# Patient Record
Sex: FEMALE | Race: WHITE | NOT HISPANIC OR LATINO | Employment: FULL TIME | URBAN - NONMETROPOLITAN AREA
[De-identification: names, ages, dates, MRNs, and addresses within clinical notes are randomized per-mention and may not be internally consistent; named-entity substitution may affect disease eponyms.]

---

## 2017-03-21 ENCOUNTER — OFFICE VISIT (OUTPATIENT)
Dept: RETAIL CLINIC | Facility: CLINIC | Age: 32
End: 2017-03-21

## 2017-03-21 VITALS — TEMPERATURE: 99.9 F | HEART RATE: 90 BPM | OXYGEN SATURATION: 97 % | RESPIRATION RATE: 18 BRPM

## 2017-03-21 DIAGNOSIS — J10.1 INFLUENZA A: Primary | ICD-10-CM

## 2017-03-21 DIAGNOSIS — J45.901 ASTHMA WITH ACUTE EXACERBATION, UNSPECIFIED ASTHMA SEVERITY: ICD-10-CM

## 2017-03-21 DIAGNOSIS — H65.191 OTHER ACUTE NONSUPPURATIVE OTITIS MEDIA OF RIGHT EAR, RECURRENCE NOT SPECIFIED: ICD-10-CM

## 2017-03-21 LAB
EXPIRATION DATE: NORMAL
FLUAV AG NPH QL: POSITIVE
FLUBV AG NPH QL: NEGATIVE
INTERNAL CONTROL: NORMAL
Lab: NORMAL

## 2017-03-21 PROCEDURE — 87804 INFLUENZA ASSAY W/OPTIC: CPT | Performed by: NURSE PRACTITIONER

## 2017-03-21 PROCEDURE — 99213 OFFICE O/P EST LOW 20 MIN: CPT | Performed by: NURSE PRACTITIONER

## 2017-03-21 RX ORDER — AMOXICILLIN 875 MG/1
875 TABLET, COATED ORAL 2 TIMES DAILY
Qty: 20 TABLET | Refills: 0 | Status: SHIPPED | OUTPATIENT
Start: 2017-03-21 | End: 2017-03-31

## 2017-03-21 RX ORDER — OSELTAMIVIR PHOSPHATE 75 MG/1
75 CAPSULE ORAL 2 TIMES DAILY
Qty: 10 CAPSULE | Refills: 0 | Status: SHIPPED | OUTPATIENT
Start: 2017-03-21 | End: 2020-05-29

## 2017-03-21 RX ORDER — PREDNISONE 10 MG/1
TABLET ORAL
Qty: 21 TABLET | Refills: 0 | Status: SHIPPED | OUTPATIENT
Start: 2017-03-21 | End: 2020-05-29

## 2017-03-21 RX ORDER — ALBUTEROL SULFATE 90 UG/1
2 AEROSOL, METERED RESPIRATORY (INHALATION) EVERY 4 HOURS PRN
COMMUNITY

## 2017-03-21 NOTE — PATIENT INSTRUCTIONS
"You have tested positive today for influenza or \"the flu.\" Because your symptoms began less than 48 hours ago, you are being treated with Tamiflu. This medication will not cure the flu, but it may help with reducing the severity and duration of the symptoms. The flu is a viral illness, and can last 10-14 days before it resolves. Symptomatic treatment is recommended - antibiotics will not help.  However, you should complete antibiotics as written for a secondary ear infection.  Take Ibuprofen or Tylenol as needed for fever. Mucinex or Robitussion may help with clearing cough and congestion. Increase your intake of clear, decaffinated fluids and get plenty of rest. For fever that persists beyond 5 days or worsening symptoms, follow up with your primary care provider. Pt verbalized understanding, visit summary given.     "

## 2017-03-21 NOTE — PROGRESS NOTES
Bing Sultana is a 31 y.o. female.   Chief Complaint   Patient presents with   • Flu Symptoms      HPI Comments: Intermittent right ear symptoms for several days.    Fever, chills, cough, sore throat, nasal congestion started abruptly yesterday.  Taking tylenol/motrin with some relief of fever.  Using albuterol more often.   Did not have flu vaccine.       Flu Symptoms   Associated symptoms include chills, congestion, coughing, fatigue, a fever (101), headaches and a sore throat. Pertinent negatives include no abdominal pain, chest pain, nausea, rash or vomiting.        The following portions of the patient's history were reviewed and updated as appropriate: allergies, current medications, past family history, past medical history, past social history, past surgical history and problem list.    Current Outpatient Prescriptions:   •  albuterol (PROVENTIL HFA;VENTOLIN HFA) 108 (90 BASE) MCG/ACT inhaler, Inhale 2 puffs Every 4 (Four) Hours As Needed for Wheezing., Disp: , Rfl:   •  BusPIRone HCl (BUSPAR PO), Take  by mouth., Disp: , Rfl:   •  Citalopram Hydrobromide (CELEXA PO), Take  by mouth., Disp: , Rfl:   •  mometasone-formoterol (DULERA 200) 200-5 MCG/ACT inhaler, Inhale 2 puffs 2 (Two) Times a Day., Disp: , Rfl:   •  RaNITidine HCl (ZANTAC PO), Take  by mouth., Disp: , Rfl:   •  amoxicillin (AMOXIL) 875 MG tablet, Take 1 tablet by mouth 2 (Two) Times a Day for 10 days., Disp: 20 tablet, Rfl: 0  •  oseltamivir (TAMIFLU) 75 MG capsule, Take 1 capsule by mouth 2 (Two) Times a Day., Disp: 10 capsule, Rfl: 0  •  PredniSONE (DELTASONE) 10 MG (21) tablet pack, Use as directed on package, Disp: 21 tablet, Rfl: 0    Review of Systems   Constitutional: Positive for appetite change (slightly decreased), chills, fatigue and fever (101).        No body aches   HENT: Positive for congestion, ear pain, postnasal drip, rhinorrhea, sinus pressure, sneezing, sore throat and voice change. Negative for facial swelling  and mouth sores. Ear discharge: pressure.    Eyes: Negative for discharge.   Respiratory: Positive for cough, chest tightness and wheezing. Negative for shortness of breath.    Cardiovascular: Negative for chest pain.   Gastrointestinal: Negative for abdominal pain, diarrhea, nausea and vomiting.   Skin: Negative for rash.   Neurological: Positive for headaches. Negative for dizziness and light-headedness.     Visit Vitals   • Pulse 90   • Temp 99.9 °F (37.7 °C)   • Resp 18   • SpO2 97%       Objective   No Known Allergies    Physical Exam   Constitutional: She is oriented to person, place, and time. She appears well-developed and well-nourished. She appears ill (mild). No distress.   HENT:   Head: Normocephalic and atraumatic.   Right Ear: External ear normal. Tympanic membrane is erythematous (moderate erythema, poor landmarks and light reflex). A middle ear effusion is present.   Left Ear: External ear normal. Tympanic membrane is not erythematous. A middle ear effusion is present.   Nose: Rhinorrhea present. Right sinus exhibits no maxillary sinus tenderness and no frontal sinus tenderness. Left sinus exhibits no maxillary sinus tenderness and no frontal sinus tenderness.   Mouth/Throat: Posterior oropharyngeal erythema (Mild with clear post nasal drip) present. No oropharyngeal exudate or posterior oropharyngeal edema.   Bilateral TM with mild serous effusion.    Significant nasal congestion.   Eyes: Conjunctivae, EOM and lids are normal.   Neck: Full passive range of motion without pain.   Cardiovascular: Normal rate and regular rhythm.    Pulmonary/Chest: Effort normal. No respiratory distress. She has wheezes (moderate wheezing, no rhonchi).   Abdominal: Soft. Normal appearance and bowel sounds are normal. There is no tenderness.   Lymphadenopathy:        Head (right side): No tonsillar adenopathy present.        Head (left side): No tonsillar adenopathy present.     She has no cervical adenopathy.    Neurological: She is alert and oriented to person, place, and time.   Skin: Skin is warm and dry. No rash noted.       Assessment/Plan   Basilia was seen today for flu symptoms.    Diagnoses and all orders for this visit:    Influenza A  -     POC Influenza A / B    Other acute nonsuppurative otitis media of right ear, recurrence not specified    Asthma with acute exacerbation, unspecified asthma severity    Other orders  -     oseltamivir (TAMIFLU) 75 MG capsule; Take 1 capsule by mouth 2 (Two) Times a Day.  -     amoxicillin (AMOXIL) 875 MG tablet; Take 1 tablet by mouth 2 (Two) Times a Day for 10 days.  -     PredniSONE (DELTASONE) 10 MG (21) tablet pack; Use as directed on package        Results for orders placed or performed in visit on 03/21/17   POC Influenza A / B   Result Value Ref Range    Rapid Influenza A Ag Positive     Rapid Influenza B Ag Negative     Internal Control Passed Passed    Lot Number [id number]     Expiration Date 08/31/2018

## 2019-06-21 ENCOUNTER — OFFICE VISIT (OUTPATIENT)
Dept: PULMONOLOGY | Facility: CLINIC | Age: 34
End: 2019-06-21

## 2019-06-21 VITALS
TEMPERATURE: 98 F | WEIGHT: 159 LBS | BODY MASS INDEX: 28.17 KG/M2 | OXYGEN SATURATION: 98 % | SYSTOLIC BLOOD PRESSURE: 118 MMHG | HEART RATE: 66 BPM | HEIGHT: 63 IN | DIASTOLIC BLOOD PRESSURE: 87 MMHG

## 2019-06-21 DIAGNOSIS — F17.210 NICOTINE DEPENDENCE, CIGARETTES, UNCOMPLICATED: ICD-10-CM

## 2019-06-21 DIAGNOSIS — J43.2 CENTRILOBULAR EMPHYSEMA (HCC): Primary | ICD-10-CM

## 2019-06-21 DIAGNOSIS — J41.0 SIMPLE CHRONIC BRONCHITIS (HCC): Primary | ICD-10-CM

## 2019-06-21 PROCEDURE — 99407 BEHAV CHNG SMOKING > 10 MIN: CPT | Performed by: NURSE PRACTITIONER

## 2019-06-21 PROCEDURE — 99243 OFF/OP CNSLTJ NEW/EST LOW 30: CPT | Performed by: NURSE PRACTITIONER

## 2019-06-21 RX ORDER — NICOTINE 21 MG/24HR
1 PATCH, TRANSDERMAL 24 HOURS TRANSDERMAL EVERY 24 HOURS
Qty: 14 PATCH | Refills: 0 | Status: SHIPPED | OUTPATIENT
Start: 2019-06-21 | End: 2020-05-29

## 2019-06-21 RX ORDER — NICOTINE 21 MG/24HR
1 PATCH, TRANSDERMAL 24 HOURS TRANSDERMAL EVERY 24 HOURS
Qty: 14 PATCH | Refills: 1 | Status: SHIPPED | OUTPATIENT
Start: 2019-06-21 | End: 2020-05-29

## 2019-06-21 RX ORDER — PANTOPRAZOLE SODIUM 40 MG/1
40 TABLET, DELAYED RELEASE ORAL DAILY
COMMUNITY

## 2019-06-21 RX ORDER — MONTELUKAST SODIUM 10 MG/1
10 TABLET ORAL NIGHTLY
COMMUNITY
End: 2023-02-16

## 2019-06-21 RX ORDER — LEVOTHYROXINE SODIUM 0.05 MG/1
50 TABLET ORAL DAILY
COMMUNITY

## 2019-06-21 NOTE — PROGRESS NOTES
[facility] [place].  Chest x-ray showed mild peribronchial cuffing in both lungs.  Will order a CT of the chest to further evaluate.

## 2019-06-21 NOTE — PROGRESS NOTES
Were you born premature?  No    Any Childhood infections? No      Breathing problems when you were a child? No    Any childhood allergies?    No           At what age did you begin smoking? 10 years old     Smoking marijuana? No    Any IV drugs? No    How many packs per day?  1 1/2 packs    Lung Function Test? no  Chest X-Ray? yes    CT Chest? no Allergy Test? yes    Family hx of Lung disease or Lung Cancer? Yes    If FHx is posivitive for lung cancer, what is the relationship of the family member? Grandpa ( moms side )    Any hospitalization in the last year? No    How far can you walk without getting short of breath? Not very far     Any coughing?  Yes    Any wheezing? Yes    Acid Reflux? Yes    Do you snore? Yes    Daytime Fatigue? Yes    Any pets? 10 cats (outside)   Any pet allergies? No    Occupation? [employer]     Have you been exposed to any chemicals at your job? No    What inhalers are you currently using? Asmanex, trelegy and albuterol and xopenex    Have you had the Influenza Vaccine? no   Would you like to receive this Vaccine today? no    Have you had the Pneumonia Vaccine?  no  Would you like to receive this Vaccine today? no      Subjective    Basilia Sultana presents for the following Asthma and COPD  .    History of Present Illness     Ms. Sultana is a 33 year old female that has a medical history significant for COPD.    A consultation was placed for COPD by Anum Mayes NP.  Ms. Sultana tells me that she has been told that she has COPD but no testing has ever been done to confirm this diagnosis.  States that she experiences short of breath that is mostly with exertion, and a cough that is worse in the morning time and with exertion.  She also tells me that she does have some wheezing that is predominant in the evening time.  She does state that she snores and has daytime fatigue and suffers from early morning headaches.  She is unsure about any witnessed episodes of apnea, but states  that she did a sleep study about 4 years ago and it showed that she did not have sleep apnea.  She is currently using Asmanex inhaler as well as Trelegy once daily.  She also has albuterol and Xopenex to use as needed.  She states that she smokes about 1/2 packs of cigarettes per day   And has smoked since she was about 10 years old.      Review of Systems   Respiratory: Positive for cough, shortness of breath and wheezing. Negative for chest tightness.    Cardiovascular: Negative for chest pain and palpitations.   Neurological: Positive for headaches. Negative for dizziness and light-headedness.       Active Problems:  Problem List Items Addressed This Visit        Respiratory    Simple chronic bronchitis (CMS/[facility]) - Primary    Relevant Medications    mometasone (ASMANEX TWISTHALER) inhaler 220 mcg/inhalation    Fluticasone-Umeclidin-Vilant 100-62.5-25 MCG/INH aerosol powder     montelukast (SINGULAIR) 10 MG tablet       Other    Nicotine dependence, cigarettes, uncomplicated          Past Medical History:  Past Medical History:   Diagnosis Date   • Acid reflux    • Allergic    • Anxiety    • Asthma    • COPD (chronic obstructive pulmonary disease) (CMS/[facility])        Family History:  History reviewed. No pertinent family history.    Social History:  Social History     Tobacco Use   • Smoking status: Former Smoker     Packs/day: 2.00     Years: 12.00     Pack years: 24.00     Last attempt to quit: 2017     Years since quittin.3   • Tobacco comment: Also daily passive smoke exposure   Substance Use Topics   • Alcohol use: No       Current Medications:  Current Outpatient Medications   Medication Sig Dispense Refill   • albuterol (PROVENTIL HFA;VENTOLIN HFA) 108 (90 BASE) MCG/ACT inhaler Inhale 2 puffs Every 4 (Four) Hours As Needed for Wheezing.     • BusPIRone HCl (BUSPAR PO) Take  by mouth.     • Citalopram Hydrobromide (CELEXA PO) Take  by mouth.     • Fluticasone-Umeclidin-Vilant 100-62.5-25 MCG/INH aerosol  "powder  Inhale.     • levothyroxine (SYNTHROID, LEVOTHROID) 50 MCG tablet Take 50 mcg by mouth Daily.     • mometasone (ASMANEX TWISTHALER) inhaler 220 mcg/inhalation Inhale 2 puffs Daily.     • montelukast (SINGULAIR) 10 MG tablet Take 10 mg by mouth Every Night.     • pantoprazole (PROTONIX) 40 MG EC tablet Take 40 mg by mouth Daily.     • mometasone-formoterol (DULERA 200) 200-5 MCG/ACT inhaler Inhale 2 puffs 2 (Two) Times a Day.     • oseltamivir (TAMIFLU) 75 MG capsule Take 1 capsule by mouth 2 (Two) Times a Day. 10 capsule 0   • PredniSONE (DELTASONE) 10 MG (21) tablet pack Use as directed on package 21 tablet 0   • RaNITidine HCl (ZANTAC PO) Take  by mouth.       No current facility-administered medications for this visit.        Allergies:  No Known Allergies    Vitals:  /87   Pulse 66   Temp 98 °F (36.7 °C)   Ht 160 cm (63\")   Wt 72.1 kg (159 lb)   SpO2 98%   BMI 28.17 kg/m²     Imaging:    Imaging Results (most recent)     None          Pulmonary Functions Testing Results:    No results found for: FEV1, FVC, [id number], TLC, DLCO    Results for orders placed or performed in visit on 03/21/17   POC Influenza A / B   Result Value Ref Range    Rapid Influenza A Ag Positive     Rapid Influenza B Ag Negative     Internal Control Passed Passed    Lot Number [id number]     Expiration Date 08/31/2018        Objective   Physical Exam     GENERAL APPEARANCE: Well developed, well nourished, alert and cooperative, and appears to be in no acute distress.    HEAD: normocephalic. Atraumatic.    EYES: PERRL, EOMI. Fundi normal, vision is grossly intact.    THROAT: Oral cavity and pharynx normal. No inflammation, swelling, exudate, or lesions.     NECK: Neck supple.  No thyromegaly.    CARDIAC: Normal S1 and S2. No S3, S4 or murmurs. Rhythm is regular. There is no peripheral edema, cyanosis or pallor. Extremities are warm and well perfused. Capillary refill is less than 2 seconds. No carotid " "bruits.    RESPIRATORY:Bilateral air entry positive. Bilateral diminished breath sounds. No wheezing, crackles or rhonchi noted.    GI: Positive bowel sounds. Soft, nondistended, nontender.     MUSCULOSKELETAL: No significant deformity or joint abnormality. No edema. Peripheral pulses intact. No varicosities.    NEUROLOGICAL: Strength and sensation symmetric and intact throughout.     PSYCHIATRIC: The mental examination revealed the patient was oriented to person, place, and time.       Assessment/Plan      COPD:  - Ordered alpha-1 antitrypsin levels.  If alpha antitrypsin levels are low then we will send the genotype and start the patient on enzyme supplementation.  - Continue ventolin and xopenex as needed.  Explained to patient that she only needed to be using one of these inhalers at a time.  -Continue Trelegy, one puff once daily.   -Instructed her to stop taking the asmanex.  -C[facility] in [place] [facility] and according to the patient showed \"bronchial cuffing.\"  Will obtain this report.  -Ordered a PFT to assess lung function.  -Ordered an echo to assess valvular function.  -will order an overnight oximetry to assess oxygen saturation when she is sleeping.    Patient's Body mass index is 28.17 kg/m². BMI is above normal parameters. Recommendations include: exercise counseling and nutrition counseling.      Current smoker:  -Ask about 1/2 packs/day.  -Patient states that she would like to quit and requests nicotine patches to be sent to her pharmacy.    Social History     Tobacco Use   Smoking Status Former Smoker   • Packs/day: 2.00   • Years: 12.00   • Pack years: 24.00   • Last attempt to quit: 2017   • Years since quittin.3   Tobacco Comment    Also daily passive smoke exposure       I advised Basilia of the risks of continuing to use tobacco, and I provided her with tobacco cessation educational materials in the After Visit Summary.     During this visit, I spent " greater than 10 minutes counseling the patient regarding tobacco cessation.        ICD-10-CM ICD-9-CM   1. Simple chronic bronchitis (CMS/[facility]) J41.0 491.0   2. Nicotine dependence, cigarettes, uncomplicated F17.210 305.1       Return in about 3 months (around 9/21/2019).

## 2019-06-26 ENCOUNTER — DOCUMENTATION (OUTPATIENT)
Dept: PULMONOLOGY | Facility: CLINIC | Age: 34
End: 2019-06-26

## 2019-07-08 ENCOUNTER — DOCUMENTATION (OUTPATIENT)
Dept: PULMONOLOGY | Facility: CLINIC | Age: 34
End: 2019-07-08

## 2019-07-09 ENCOUNTER — TELEPHONE (OUTPATIENT)
Dept: PULMONOLOGY | Facility: CLINIC | Age: 34
End: 2019-07-09

## 2019-07-09 NOTE — TELEPHONE ENCOUNTER
Julissa Holm, MARY Gordillo, Pa Khan, MA             Will you let her know that her alpha one antitrypsin test was normal

## 2019-08-06 ENCOUNTER — HOSPITAL ENCOUNTER (OUTPATIENT)
Dept: RESPIRATORY THERAPY | Facility: HOSPITAL | Age: 34
Discharge: HOME | End: 2019-08-06
Admitting: NURSE PRACTITIONER

## 2019-08-06 DIAGNOSIS — J41.0 SIMPLE CHRONIC BRONCHITIS (HCC): ICD-10-CM

## 2019-08-06 PROCEDURE — 94060 EVALUATION OF WHEEZING: CPT

## 2019-08-06 PROCEDURE — 94727 GAS DIL/WSHOT DETER LNG VOL: CPT | Performed by: INTERNAL MEDICINE

## 2019-08-06 PROCEDURE — 94060 EVALUATION OF WHEEZING: CPT | Performed by: INTERNAL MEDICINE

## 2019-08-06 PROCEDURE — 94727 GAS DIL/WSHOT DETER LNG VOL: CPT

## 2019-08-06 PROCEDURE — 94729 DIFFUSING CAPACITY: CPT

## 2019-08-06 PROCEDURE — 94729 DIFFUSING CAPACITY: CPT | Performed by: INTERNAL MEDICINE

## 2019-08-06 PROCEDURE — 94640 AIRWAY INHALATION TREATMENT: CPT

## 2019-08-06 RX ORDER — ALBUTEROL SULFATE 2.5 MG/3ML
2.5 SOLUTION RESPIRATORY (INHALATION) ONCE
Status: COMPLETED | OUTPATIENT
Start: 2019-08-06 | End: 2019-08-06

## 2019-08-06 RX ADMIN — ALBUTEROL SULFATE 2.5 MG: 2.5 SOLUTION RESPIRATORY (INHALATION) at 10:37

## 2019-08-27 ENCOUNTER — TELEPHONE (OUTPATIENT)
Dept: PULMONOLOGY | Facility: CLINIC | Age: 34
End: 2019-08-27

## 2019-08-27 NOTE — TELEPHONE ENCOUNTER
Julissa Holm, MARY Gordillo, Pa Khan MA             Will you let her know that her PFT showed a moderately severe COPD.  We will continue her trelegy inhaler.  Can you Ask her if she is having any problems with this inhaler

## 2019-09-26 ENCOUNTER — OFFICE VISIT (OUTPATIENT)
Dept: PULMONOLOGY | Facility: CLINIC | Age: 34
End: 2019-09-26

## 2019-09-26 VITALS
OXYGEN SATURATION: 98 % | TEMPERATURE: 98 F | HEART RATE: 73 BPM | DIASTOLIC BLOOD PRESSURE: 78 MMHG | HEIGHT: 63 IN | BODY MASS INDEX: 35.61 KG/M2 | WEIGHT: 201 LBS | SYSTOLIC BLOOD PRESSURE: 117 MMHG

## 2019-09-26 DIAGNOSIS — J41.0 SIMPLE CHRONIC BRONCHITIS (HCC): Primary | ICD-10-CM

## 2019-09-26 DIAGNOSIS — F17.210 NICOTINE DEPENDENCE, CIGARETTES, UNCOMPLICATED: ICD-10-CM

## 2019-09-26 PROCEDURE — 99407 BEHAV CHNG SMOKING > 10 MIN: CPT | Performed by: NURSE PRACTITIONER

## 2019-09-26 PROCEDURE — 99214 OFFICE O/P EST MOD 30 MIN: CPT | Performed by: NURSE PRACTITIONER

## 2020-05-28 ENCOUNTER — OFFICE VISIT (OUTPATIENT)
Dept: PULMONOLOGY | Facility: CLINIC | Age: 35
End: 2020-05-28

## 2020-05-28 VITALS
WEIGHT: 186 LBS | DIASTOLIC BLOOD PRESSURE: 72 MMHG | HEART RATE: 59 BPM | HEIGHT: 63 IN | OXYGEN SATURATION: 93 % | SYSTOLIC BLOOD PRESSURE: 122 MMHG | BODY MASS INDEX: 32.96 KG/M2 | TEMPERATURE: 98.7 F

## 2020-05-28 DIAGNOSIS — Z78.9 ELECTRONIC CIGARETTE USE: ICD-10-CM

## 2020-05-28 DIAGNOSIS — E66.9 OBESITY (BMI 30-39.9): Primary | ICD-10-CM

## 2020-05-28 DIAGNOSIS — J41.0 SIMPLE CHRONIC BRONCHITIS (HCC): ICD-10-CM

## 2020-05-28 DIAGNOSIS — G47.33 OSA (OBSTRUCTIVE SLEEP APNEA): ICD-10-CM

## 2020-05-28 PROCEDURE — 99214 OFFICE O/P EST MOD 30 MIN: CPT | Performed by: NURSE PRACTITIONER

## 2020-05-28 NOTE — PROGRESS NOTES
Have you had the Influenza Vaccine? no   Would you like to receive this Vaccine today? no    Have you had the Pneumonia Vaccine?  no  Would you like to receive this Vaccine today? no    Are you a current smoker? yes   How much?Currently  Vaping      Subjective    Basilia Sultana presents for the following Bronchitis      History of Present Illness     Ms. Sultana is a 34-year-old female with a medical history significant for acid reflux, COPD, and hypothyroidism.    She presents today for routine follow-up on chronic bronchitis.  She continues to report a cough and shortness of breath.  She states that she feels that her breathing has got worse over the last several months.  She is currently taking a Trelegy Ellipta inhaler 1 puff once daily and using albuterol as needed.  She states that she quit smoking about 6 or 7 months ago but has since started to use an electronic cigarette.  She states that she feels the electronic cigarette actually makes her more short of breath and has worsening her breathing.  She tells me that she is using her albuterol inhaler 3-4 times each day.  She also voices complaints of orthopnea.    Review of Systems   Constitutional: Negative for activity change, fatigue and unexpected weight change.   HENT: Negative for congestion, postnasal drip and rhinorrhea.    Respiratory: Positive for cough and shortness of breath. Negative for apnea, chest tightness and wheezing.    Cardiovascular: Negative for chest pain and palpitations.   Gastrointestinal: Negative for nausea.   Allergic/Immunologic: Negative for environmental allergies.   Psychiatric/Behavioral: Negative for agitation and confusion.       Active Problems:  Problem List Items Addressed This Visit        Respiratory    Simple chronic bronchitis (CMS/HCC)    Relevant Medications    tiotropium bromide-olodaterol (STIOLTO RESPIMAT) 2.5-2.5 MCG/ACT aerosol solution inhaler      Other Visit Diagnoses     Obesity (BMI 30-39.9)    -   "Primary    Electronic cigarette use        KISHAN (obstructive sleep apnea)        Relevant Orders    Home Sleep Study          Past Medical History:  Past Medical History:   Diagnosis Date   • Acid reflux    • Allergic    • Anxiety    • Asthma    • COPD (chronic obstructive pulmonary disease) (CMS/[facility])        Family History:  History reviewed. No pertinent family history.    Social History:  Social History     Tobacco Use   • Smoking status: Current Every Day Smoker     Packs/day: 0.00     Years: 12.00     Pack years: 0.00     Types: Electronic Cigarette     Last attempt to quit: 02/2017     Years since quitting: 3.3   • Smokeless tobacco: Never Used   • Tobacco comment: Also daily passive smoke exposure   Substance Use Topics   • Alcohol use: No       Current Medications:  Current Outpatient Medications   Medication Sig Dispense Refill   • albuterol (PROVENTIL HFA;VENTOLIN HFA) 108 (90 BASE) MCG/ACT inhaler Inhale 2 puffs Every 4 (Four) Hours As Needed for Wheezing.     • BusPIRone HCl (BUSPAR PO) Take  by mouth.     • Citalopram Hydrobromide (CELEXA PO) Take  by mouth.     • levothyroxine (SYNTHROID, LEVOTHROID) 50 MCG tablet Take 50 mcg by mouth Daily.     • mometasone (ASMANEX TWISTHALER) inhaler 220 mcg/inhalation Inhale 2 puffs Daily.     • montelukast (SINGULAIR) 10 MG tablet Take 10 mg by mouth Every Night.     • pantoprazole (PROTONIX) 40 MG EC tablet Take 40 mg by mouth Daily.     • RaNITidine HCl (ZANTAC PO) Take  by mouth.     • tiotropium bromide-olodaterol (STIOLTO RESPIMAT) 2.5-2.5 MCG/ACT aerosol solution inhaler Inhale 2 puffs Daily. 4 g 11     No current facility-administered medications for this visit.        Allergies:  No Known Allergies    Vitals:  /72   Pulse 59   Temp 98.7 °F (37.1 °C) (Temporal)   Ht 160 cm (63\")   Wt 84.4 kg (186 lb)   SpO2 93%   BMI 32.95 kg/m²     Imaging:    Imaging Results (Most Recent)     None          Pulmonary Functions Testing Results:    No results found " for: FEV1, FVC, LOO2WOO, TLC, DLCO    Results for orders placed or performed in visit on 03/21/17   POC Influenza A / B   Result Value Ref Range    Rapid Influenza A Ag Positive     Rapid Influenza B Ag Negative     Internal Control Passed Passed    Lot Number [id number]     Expiration Date 08/31/2018        Objective   Physical Exam     GENERAL APPEARANCE: Well developed, well nourished, alert and cooperative, and appears to be in no acute distress.    HEAD: normocephalic. Atraumatic.    EYES: PERRL, EOMI. Vision is grossly intact.    THROAT: Oral cavity and pharynx normal. No inflammation, swelling, exudate, or lesions.     NECK: Neck supple.  No thyromegaly.    CARDIAC: Normal S1 and S2. No S3, S4 or murmurs. Rhythm is regular.     RESPIRATORY:Bilateral air entry positive. Bilateral diminished breath sounds. No wheezing, crackles or rhonchi noted.    GI: Positive bowel sounds. Soft, nondistended, nontender.     MUSCULOSKELETAL: No significant deformity or joint abnormality. No edema. Peripheral pulses intact. No varicosities.    NEUROLOGICAL: Strength and sensation symmetric and intact throughout.     PSYCHIATRIC: The mental examination revealed the patient was oriented to person, place, and time.       Assessment/Plan      Chronic bronchitis:  -Alpha-1 antitrypsin genotype is MM  -Continue albuterol every 4 hours as needed  -Will take her off Trelegy Ellipta inhaler and start her on Stiolto.  She tells me that she feels that much of the powder inhaler medication is staying in her mouth and that she is not getting a proper dose.  Advised her to take Stiolto 2 puffs once daily and I will check back on her in a month and if her symptoms are not controlled we will start her on an additional inhaler.  She verbalized understanding.  -PFT was reviewed and shows a moderately severe obstruction with no significant bronchodilator response.        She reports that she quit smoking about 6 or 7 months ago but has since started  to use an electronic cigarette.  Advised her of the dangers of using electronic cigarettes and that cessation of e-cigarettes would be beneficial for her.  She verbalized understanding and tells me that she will try to quit.    KISHAN:  -We will order a home sleep study to rule out sleep apnea.  -Patient's Body mass index is 32.95 kg/m². BMI is above normal parameters. Recommendations include: exercise counseling and nutrition counseling.  - Patient was educated on positive airway pressure treatment.  As per CMS guidelines, more than 4 hours on 70% of observed nights is considered adherence. Patient was strongly encouraged to use CPAP as much as possible during sleep as more CPAP use is equal to more benefit. Use of heated humidification in positive airway pressure treatment to improve the adherence to the device.  In case of claustrophobia, we will provide the patient cognitive behavioral therapy and desensitization. Oral appliances use will be discussed with the patient in case of mild to moderate sleep apnea or if the patient with severe disease fail positive airway pressure treatment.       The patient was extensively educated on the consequences of untreated obstructive sleep apnea namely cardiovascular/metabolic disorder, neurocognitive deficit, daytime sleepiness, motor vehicle accidents, depression, mood disorders and reduced quality of life.  At the end of conversation, the patient voices understanding of the disease process and treatment modality.  Patient also understands the risk of untreated obstructive sleep apnea and benefit benefits of the treatment.    Counseling time was greater than 10 minutes.          ICD-10-CM ICD-9-CM   1. Obesity (BMI 30-39.9) E66.9 278.00   2. Simple chronic bronchitis (CMS/[facility]) J41.0 491.0   3. Electronic cigarette use Z78.9 305.1   4. KISHAN (obstructive sleep apnea) G47.33 327.23       Return in about 6 months (around 11/28/2020).

## 2020-06-18 DIAGNOSIS — G47.33 OSA (OBSTRUCTIVE SLEEP APNEA): Primary | ICD-10-CM

## 2020-06-19 ENCOUNTER — TELEPHONE (OUTPATIENT)
Dept: PULMONOLOGY | Facility: CLINIC | Age: 35
End: 2020-06-19

## 2020-06-19 NOTE — TELEPHONE ENCOUNTER
Called to let patient know she will need an autopap to sleep with at night. She would like to use a company in [place] so I will forward order to Lela at [facility].

## 2020-12-01 ENCOUNTER — OFFICE VISIT (OUTPATIENT)
Dept: PULMONOLOGY | Facility: CLINIC | Age: 35
End: 2020-12-01

## 2020-12-01 VITALS
DIASTOLIC BLOOD PRESSURE: 76 MMHG | BODY MASS INDEX: 31.89 KG/M2 | WEIGHT: 180 LBS | OXYGEN SATURATION: 98 % | SYSTOLIC BLOOD PRESSURE: 115 MMHG | HEIGHT: 63 IN | HEART RATE: 88 BPM | TEMPERATURE: 97.5 F

## 2020-12-01 DIAGNOSIS — J43.2 CENTRILOBULAR EMPHYSEMA (HCC): ICD-10-CM

## 2020-12-01 DIAGNOSIS — F17.200 CURRENT SMOKER: Primary | ICD-10-CM

## 2020-12-01 DIAGNOSIS — G47.33 OSA (OBSTRUCTIVE SLEEP APNEA): ICD-10-CM

## 2020-12-01 PROCEDURE — 99213 OFFICE O/P EST LOW 20 MIN: CPT | Performed by: INTERNAL MEDICINE

## 2020-12-01 PROCEDURE — 99406 BEHAV CHNG SMOKING 3-10 MIN: CPT | Performed by: INTERNAL MEDICINE

## 2020-12-01 NOTE — PROGRESS NOTES
Chief complaint:   Chief Complaint   Patient presents with   • Bronchitis       History of present illness:   Ms. Sultana is a very pleasant 35-year-old female with a past medical history of moderate COPD, obstructive sleep apnea and active smoking.  She follows with pulmonary outpatient clinic for COPD, and KISHAN management.  She is compliant with her rescue inhaler and Stiolto Respimat and Asmanex twist inhaler.  She reports gradual worsening of her shortness of breath.  She is currently an active smoker.  He does complain of chest heaviness and intermittent wheezing.  She denies any cough.  Her shortness of breath increases activity and decreased on rest.  She denies any night sweats or weight loss.  She is compliant with her AutoPap machine.  She denies any complain of morning headaches, excessive daytime sleepiness or unrefreshed sleep.        Review of Systems:   General ROS: negative for chills, fatigue or fever  Psychological ROS: negative for anxiety or depression  ENT ROS: negative for headaches, visual changes or vocal changes  Respiratory ROS: Negative for cough.  Positive shortness of breath  Cardiovascular ROS: Positive for chest congestion.  Positive for dyspnea on exertion  Gastrointestinal ROS: no abdominal pain, change in bowel habits, or black or bloody stools  Musculoskeletal ROS: negative for joint pain, joint stiffness or joint swelling  Neurological ROS: no TIA or stroke symptoms  Heme- no bleeding, no lumps and bumps in armpit  Skin- no bruises, no rash    Past medical history, past surgical history, social history and family history:  •  has a past medical history of Acid reflux, Allergic, Anxiety, Asthma, and COPD (chronic obstructive pulmonary disease) (CMS/[facility]).  •  has a past surgical history that includes Cholecystectomy.  •  reports that she has been smoking electronic cigarette. She has a 24.00 pack-year smoking history. She has never used smokeless tobacco. She reports that she does  "not drink alcohol or use drugs.  • family history is not on file.     Medication and allergies:  • Active medication:  Current Outpatient Medications on File Prior to Visit   Medication Sig   • albuterol (PROVENTIL HFA;VENTOLIN HFA) 108 (90 BASE) MCG/ACT inhaler Inhale 2 puffs Every 4 (Four) Hours As Needed for Wheezing.   • BusPIRone HCl (BUSPAR PO) Take  by mouth.   • Citalopram Hydrobromide (CELEXA PO) Take  by mouth.   • levothyroxine (SYNTHROID, LEVOTHROID) 50 MCG tablet Take 50 mcg by mouth Daily.   • montelukast (SINGULAIR) 10 MG tablet Take 10 mg by mouth Every Night.   • pantoprazole (PROTONIX) 40 MG EC tablet Take 40 mg by mouth Daily.   • RaNITidine HCl (ZANTAC PO) Take  by mouth.   • [DISCONTINUED] mometasone (ASMANEX TWISTHALER) inhaler 220 mcg/inhalation Inhale 2 puffs Daily.   • [DISCONTINUED] tiotropium bromide-olodaterol (STIOLTO RESPIMAT) 2.5-2.5 MCG/ACT aerosol solution inhaler Inhale 2 puffs Daily.     No current facility-administered medications on file prior to visit.         Allergies:    Patient has no known allergies.      Vital Signs    height is 160 cm (63\") and weight is 81.6 kg (180 lb). Her temporal temperature is 97.5 °F (36.4 °C). Her blood pressure is 115/76 and her pulse is 88. Her oxygen saturation is 98%.      Physical Exam:  Constitutional:  oriented to person, place, and time. No distress.   Head: Normocephalic and atraumatic.   Right Ear and left Ear : External ear normal.   Nose: Nose normal.   Eyes: Pupils are equal, round, and reactive to light.  No scleral icterus.   Neck: Normal range of motion. Neck supple.    Cardiovascular: Normal rate, regular rhythm, normal heart sounds. No murmur heard.  Pulmonary: Bilateral air entry equal.  No wheezing.  No rhonchi.  No crackles.  Abdominal: Soft. Bowel sounds are normal. There is no tenderness.   Musculoskeletal: Normal range of motion. Exhibits no deformity.   Neurological: alert and oriented to person, place, and time. No " cranial nerve deficit. Coordination normal.   Skin: Skin is warm and dry. No erythema.   Psychiatric:Normal mood and affect.   behavior is normal.     Result review:   • I have reviewed the pulmonary function test.  I have reviewed the home sleep study.      Assessment and plan:   Diagnosis Plan   1. Current smoker  Ambulatory Referral to Smoking Cessation Program   2. Centrilobular emphysema (CMS/HCC)  Fluticasone-Umeclidin-Vilant 100-62.5-25 MCG/INH aerosol powder   On rescue inhaler   3. KISHAN (obstructive sleep apnea)   continue with AutoPap machine        Smoking cessation counseling:  Basilia Sultana  reports that she has been smoking electronic cigarette. She has a 24.00 pack-year smoking history. She has never used smokeless tobacco.. I have educated her on the risk of diseases from using tobacco products such as cancer, COPD and heart disease.   I advised her to quit and she is willing to quit. We have discussed the following method/s for tobacco cessation:  Counseling.  Together we have set a quit date for 1 month from today.  She will follow up with me in 6 months or sooner to check on her progress.  I spent 4 minutes counseling the patient.      Inhaler technique demonstration/discussion:  Following steps were discussed.  [x]Remove the cap from the inhaler and shake well.    [x]Breathe out all the way.    [x]Place the mouthpiece of the inhaler between your teeth and seal your lips tightly around it.    [x]As you start to blow in slowly, press down on the canister one time.   [x]Keep breathing in as slowly and deeply as you can.    [x]It should take about 5 seconds for you to completely breathe in.    [x]Hold your breath for 10 seconds(count to 10 slowly) to allow the medication to reach the airways of the lung.    [x]Repeat the above steps for each puff.    [x]Wait about 1 minute between the puffs.    [x]Replaced the cap on the inhaler when finished.  [x]Patient was advised to rinse the mouth after steroid  inhalation to prevent fungal mucositis(ulcers in the mouth).    Obstructive sleep apnea management counseling  I explained the patient that sleep apnea is a condition that makes you stop breathing for short, while you are asleep.  There are 2 type of sleep apnea.  One is called obstructive sleep apnea and the other is called central sleep apnea.  In obstructive sleep apnea, you stop breathing because your throat narrows or closes.  In central sleep apnea, you stop breathing because your brain does not send the right signals to your muscles to make you breathe.  When people talk about sleep apnea, the are usually referring to obstructive sleep apnea.  The main symptoms of sleep apnea are loud snoring, tiredness and daytime sleepiness.  Weight loss can help if you are overweight or obese.  But losing weight can be challenging, it may take time to lose enough weight to help with your sleep apnea.  Most people need other treatment while they work on losing weight.  The most effective treatment for sleep apnea is a device that keeps your airway open while you sleep.  The treatment with device is cold continuous positive airway pressure on CPAP.  People getting CPAP we are of the face mask at night that keeps them breathing.  The patient with sleep apnea who used the CPAP machine feel more rested and generally feels better.  Untreated sleep apnea can be dangerous.  People with sleep apnea do not get good quality sleep, so they are often tired and not alert.  This puts them at risk for car accident and other type of accident.  Plus, studies show that people with sleep apnea are more likely than others to have high blood pressure, heart attacks, mood disorders, stroke and other serious heart problems.  In people with severe sleep apnea, getting treated can help prevent some of these problems.  As per CMS guidelines, more than 4 hours on 70% of observed nights is considered adherence. At the end of conversation, the patient  voices understanding of the disease and treatment modality.  Patient also understands the risk of untreated obstructive sleep apnea and benefits of the treatment.        Follow up in 6 months and as needed.       Thank you for involving us in the care of the patient.  Scot Harden MD  Pulmonary and Critical Care Medicine

## 2021-06-02 ENCOUNTER — OFFICE VISIT (OUTPATIENT)
Dept: PULMONOLOGY | Facility: CLINIC | Age: 36
End: 2021-06-02

## 2021-06-02 VITALS
HEART RATE: 80 BPM | BODY MASS INDEX: 34.55 KG/M2 | HEIGHT: 63 IN | SYSTOLIC BLOOD PRESSURE: 128 MMHG | DIASTOLIC BLOOD PRESSURE: 88 MMHG | WEIGHT: 195 LBS | OXYGEN SATURATION: 97 % | TEMPERATURE: 97.8 F

## 2021-06-02 DIAGNOSIS — F17.210 NICOTINE DEPENDENCE, CIGARETTES, UNCOMPLICATED: ICD-10-CM

## 2021-06-02 DIAGNOSIS — G47.33 OSA (OBSTRUCTIVE SLEEP APNEA): ICD-10-CM

## 2021-06-02 DIAGNOSIS — J41.0 SIMPLE CHRONIC BRONCHITIS (HCC): Primary | ICD-10-CM

## 2021-06-02 PROCEDURE — 99214 OFFICE O/P EST MOD 30 MIN: CPT | Performed by: NURSE PRACTITIONER

## 2021-06-02 RX ORDER — PREDNISONE 5 MG/1
TABLET ORAL
COMMUNITY
Start: 2021-05-28 | End: 2023-02-16

## 2021-06-02 RX ORDER — IPRATROPIUM BROMIDE AND ALBUTEROL SULFATE 2.5; .5 MG/3ML; MG/3ML
SOLUTION RESPIRATORY (INHALATION)
COMMUNITY
Start: 2021-05-28

## 2021-06-02 RX ORDER — PREDNISONE 20 MG/1
40 TABLET ORAL DAILY
Qty: 10 TABLET | Refills: 0 | Status: SHIPPED | OUTPATIENT
Start: 2021-06-02 | End: 2023-02-16

## 2021-06-02 RX ORDER — BENZONATATE 100 MG/1
100 CAPSULE ORAL 3 TIMES DAILY PRN
Qty: 42 CAPSULE | Refills: 3 | Status: SHIPPED | OUTPATIENT
Start: 2021-06-02 | End: 2023-02-16

## 2021-06-02 RX ORDER — DOXYCYCLINE 100 MG/1
100 TABLET ORAL 2 TIMES DAILY
COMMUNITY
Start: 2021-05-28 | End: 2023-02-16

## 2021-06-02 RX ORDER — BUSPIRONE HYDROCHLORIDE 5 MG/1
5 TABLET ORAL 2 TIMES DAILY
COMMUNITY
Start: 2021-05-07 | End: 2023-02-16

## 2021-06-02 NOTE — PROGRESS NOTES
"Chief Complaint  Bronchitis    Subjective          Basilia Sultana presents to [facility] PULMONARY AND CRITICAL CARE MEDICINE  History of Present Illness     Ms. Sultana is a 35 year old female with a medical history significant for COPD and GERD.    She presents today for a routine follow up on COPD.  She states that she has been doing well until last week when she became sick.  She states that she initially went to urgent care and was diagnosed with pneumonia.  She was given a steroid shot and doxycycline.  She was not any better after a few days so she went to the ED.  There she was told that she had a COPD exacerbation and was given prednisone.  She reports that she is still not feeling better.  She states that she is suppose to be taking Trelegy once daily but has not had any in a while, as her insurance would not pay for it. She is currently smoking about 1/2 ppd.        Objective   Vital Signs:   /88   Pulse 80   Temp 97.8 °F (36.6 °C) (Temporal)   Ht 160 cm (63\")   Wt 88.5 kg (195 lb)   SpO2 97%   BMI 34.54 kg/m²     Physical Exam     GENERAL APPEARANCE: Well developed, well nourished, alert and cooperative, and appears to be in no acute distress.    HEAD: normocephalic. Atraumatic.    EYES: PERRL, EOMI. Vision is grossly intact.    THROAT: Oral cavity and pharynx normal. No inflammation, swelling, exudate, or lesions.     NECK: Neck supple.  No thyromegaly.    CARDIAC: Normal S1 and S2. No S3, S4 or murmurs. Rhythm is regular.     RESPIRATORY:Bilateral air entry positive. Bilateral diminished breath sounds. No wheezing, crackles or rhonchi noted.    GI: Positive bowel sounds. Soft, nondistended, nontender.     MUSCULOSKELETAL: No significant deformity or joint abnormality. No edema. Peripheral pulses intact. No varicosities.    NEUROLOGICAL: Strength and sensation symmetric and intact throughout.     PSYCHIATRIC: The mental examination revealed the patient was oriented to " person, place, and time.     Result Review :   The following data was reviewed by: MARY Osullivan on 06/02/2021:             Assessment and Plan    Diagnoses and all orders for this visit:    1. Simple chronic bronchitis (CMS/HCC) (Primary)    2. Nicotine dependence, cigarettes, uncomplicated    3. KISHAN (obstructive sleep apnea)    Other orders  -     benzonatate (TESSALON) 100 MG capsule; Take 1 capsule by mouth 3 (Three) Times a Day As Needed for Cough.  Dispense: 42 capsule; Refill: 3  -     predniSONE (DELTASONE) 20 MG tablet; Take 2 tablets by mouth Daily.  Dispense: 10 tablet; Refill: 0          Chronic Bronchitis:  -Will continue Trelegy once daily.  -Continue albuterol every 4 hours as needed.        KISHAN:  -Complaint with cpap nightly.  -Patient's Body mass index is 34.54 kg/m². indicating that she is obese (BMI >30). Obesity-related health conditions include the following: obstructive sleep apnea and GERD. Obesity is unchanged. BMI is is above average; BMI management plan is completed. We discussed portion control and increasing exercise..  - Patient was educated on positive airway pressure treatment.  As per CMS guidelines, more than 4 hours on 70% of observed nights is considered adherence. Patient was strongly encouraged to use CPAP as much as possible during sleep as more CPAP use is equal to more benefit. Use of heated humidification in positive airway pressure treatment to improve the adherence to the device.  In case of claustrophobia, we will provide the patient cognitive behavioral therapy and desensitization. Oral appliances use will be discussed with the patient in case of mild to moderate sleep apnea or if the patient with severe disease fail positive airway pressure treatment.       The patient was extensively educated on the consequences of untreated obstructive sleep apnea namely cardiovascular/metabolic disorder, neurocognitive deficit, daytime sleepiness, motor vehicle  accidents, depression, mood disorders and reduced quality of life.  At the end of conversation, the patient voices understanding of the disease process and treatment modality.  Patient also understands the risk of untreated obstructive sleep apnea and benefit benefits of the treatment.    Counseling time was greater than 10 minutes.        Current Smoker:  - Currently smoking about 1/2 ppd.    Basilia Sultana  reports that she has been smoking electronic cigarette. She has been smoking about 2.00 packs per day. She has never used smokeless tobacco.. I have educated her on the risk of diseases from using tobacco products such as cancer, COPD and heart disease.     I advised her to quit and she is not willing to quit.             Follow Up   Return in about 6 months (around 12/2/2021).  Patient was given instructions and counseling regarding her condition or for health maintenance advice. Please see specific information pulled into the AVS if appropriate.

## 2022-06-09 ENCOUNTER — HOSPITAL ENCOUNTER (EMERGENCY)
Dept: HOSPITAL 79 - ER1 | Age: 37
Discharge: HOME | End: 2022-06-09
Payer: COMMERCIAL

## 2022-06-09 DIAGNOSIS — R07.89: Primary | ICD-10-CM

## 2022-06-09 DIAGNOSIS — F17.210: ICD-10-CM

## 2022-06-09 DIAGNOSIS — J44.9: ICD-10-CM

## 2022-06-09 LAB
BUN/CREATININE RATIO: 11 (ref 0–10)
HGB BLD-MCNC: 11.9 GM/DL (ref 12.3–15.3)
RED BLOOD COUNT: 3.43 M/UL (ref 4–5.1)
WHITE BLOOD COUNT: 4.6 K/UL (ref 4.5–11)

## 2023-02-16 ENCOUNTER — OFFICE VISIT (OUTPATIENT)
Dept: PULMONOLOGY | Facility: CLINIC | Age: 38
End: 2023-02-16
Payer: COMMERCIAL

## 2023-02-16 VITALS
WEIGHT: 191 LBS | SYSTOLIC BLOOD PRESSURE: 124 MMHG | HEART RATE: 85 BPM | HEIGHT: 63 IN | BODY MASS INDEX: 33.84 KG/M2 | OXYGEN SATURATION: 100 % | DIASTOLIC BLOOD PRESSURE: 84 MMHG

## 2023-02-16 DIAGNOSIS — F17.210 CIGARETTE SMOKER: ICD-10-CM

## 2023-02-16 DIAGNOSIS — G47.33 OSA (OBSTRUCTIVE SLEEP APNEA): ICD-10-CM

## 2023-02-16 DIAGNOSIS — J45.50 SEVERE PERSISTENT ASTHMA WITHOUT COMPLICATION: Primary | ICD-10-CM

## 2023-02-16 PROCEDURE — 94664 DEMO&/EVAL PT USE INHALER: CPT | Performed by: NURSE PRACTITIONER

## 2023-02-16 PROCEDURE — 99214 OFFICE O/P EST MOD 30 MIN: CPT | Performed by: NURSE PRACTITIONER

## 2023-02-16 RX ORDER — VENLAFAXINE HYDROCHLORIDE 75 MG/1
75 CAPSULE, EXTENDED RELEASE ORAL DAILY
COMMUNITY
Start: 2022-06-01

## 2023-02-16 RX ORDER — MULTIVIT-MIN/IRON/FOLIC ACID/K 18-600-40
1 CAPSULE ORAL DAILY
COMMUNITY
Start: 2023-02-02

## 2023-02-16 RX ORDER — TIOTROPIUM BROMIDE AND OLODATEROL 3.124; 2.736 UG/1; UG/1
SPRAY, METERED RESPIRATORY (INHALATION)
COMMUNITY
Start: 2022-11-01 | End: 2023-02-16

## 2023-02-16 RX ORDER — BUSPIRONE HYDROCHLORIDE 10 MG/1
1 TABLET ORAL EVERY 12 HOURS SCHEDULED
COMMUNITY
Start: 2023-02-03

## 2023-02-16 RX ORDER — POTASSIUM CHLORIDE 750 MG/1
TABLET, FILM COATED, EXTENDED RELEASE ORAL
COMMUNITY
Start: 2022-12-29

## 2023-02-16 RX ORDER — LEVOTHYROXINE SODIUM 0.05 MG/1
50 TABLET ORAL
COMMUNITY
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
## 2023-11-09 ENCOUNTER — OFFICE VISIT (OUTPATIENT)
Dept: PULMONOLOGY | Facility: CLINIC | Age: 38
End: 2023-11-09
Payer: COMMERCIAL

## 2023-11-09 VITALS
WEIGHT: 199 LBS | HEIGHT: 63 IN | DIASTOLIC BLOOD PRESSURE: 77 MMHG | HEART RATE: 85 BPM | BODY MASS INDEX: 35.26 KG/M2 | OXYGEN SATURATION: 100 % | SYSTOLIC BLOOD PRESSURE: 121 MMHG

## 2023-11-09 DIAGNOSIS — G47.33 OSA (OBSTRUCTIVE SLEEP APNEA): ICD-10-CM

## 2023-11-09 DIAGNOSIS — J45.50 SEVERE PERSISTENT ASTHMA WITHOUT COMPLICATION: Primary | ICD-10-CM

## 2023-11-09 DIAGNOSIS — J30.9 ALLERGIC RHINITIS, UNSPECIFIED SEASONALITY, UNSPECIFIED TRIGGER: ICD-10-CM

## 2023-11-09 RX ORDER — MONTELUKAST SODIUM 10 MG/1
10 TABLET ORAL NIGHTLY
Qty: 30 TABLET | Refills: 5 | Status: SHIPPED | OUTPATIENT
Start: 2023-11-09

## 2023-11-09 RX ORDER — FLUTICASONE FUROATE, UMECLIDINIUM BROMIDE AND VILANTEROL TRIFENATATE 200; 62.5; 25 UG/1; UG/1; UG/1
1 POWDER RESPIRATORY (INHALATION) DAILY
Qty: 1 EACH | Refills: 5 | Status: SHIPPED | OUTPATIENT
Start: 2023-11-09

## 2023-11-09 NOTE — PROGRESS NOTES
.     Follow Up Office Visit      Patient Name: Basilia Sultana    Chief Complaint:    Chief Complaint   Patient presents with    Breathing Problem    Follow-up       History of Present Illness: Basilia Sultana is a 38 y.o. female who is here today for follow up of   COPD and KISHAN. Since last visit, she has continued on Trelegy daily. Still requiring albuterol use multiple times per day. Occasional cough, no current wheezing. She notes that allergy shots were recommended to her in the past. 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 +known KISHAN though has been intolerant to cpap use. She is interested in referral for [facility] evaluation.     Duration: Asthma diagnosed in childhood   Number of exacerbations per year: 2-3    Subjective      Review of Systems:  Review of Systems   Constitutional:  Negative for fever and unexpected weight change.   Respiratory:  Positive for cough and shortness of breath. Negative for wheezing.    Cardiovascular:  Negative for chest pain and leg swelling.   Allergic/Immunologic: Positive for environmental allergies.        Past Medical History:   Past Medical History:   Diagnosis Date    Acid reflux     Allergic     Anxiety     Asthma     COPD (chronic obstructive pulmonary disease)        Past Surgical History:   Past Surgical History:   Procedure Laterality Date    CHOLECYSTECTOMY      COLON RESECTION  2021       Family History: History reviewed. No pertinent family history.    Social History:   Social History     Socioeconomic History    Marital status:    Tobacco Use    Smoking status: Every Day     Packs/day: .5     Types: Electronic Cigarette, Cigarettes     Start date: 1/3/1995     Last attempt to quit: 2017     Years since quittin.7     Passive exposure: Current    Smokeless tobacco: Never   Vaping Use    Vaping Use: Every day    Substances: Nicotine, Flavoring    Devices: Refillable tank   Substance and  "Sexual Activity    Alcohol use: No    Drug use: No    Sexual activity: Defer       Current Medications:     Current Outpatient Medications:     albuterol sulfate  (90 Base) MCG/ACT inhaler, Inhale 2 puffs Every 4 (Four) Hours As Needed for Wheezing., Disp: 8 g, Rfl: 3    busPIRone (BUSPAR) 10 MG tablet, Take 1 tablet by mouth Every 12 (Twelve) Hours., Disp: , Rfl:     cetirizine (zyrTEC) 10 MG tablet, Take 1 tablet by mouth Daily., Disp: , Rfl:     Cholecalciferol (Vitamin D) 50 MCG (2000 UT) capsule, Take 1 capsule by mouth Daily., Disp: , Rfl:     Fluticasone-Umeclidin-Vilant (Trelegy Ellipta) 200-62.5-25 MCG/ACT aerosol powder , Inhale 1 puff Daily. Rinse mouth out after use, Disp: 1 each, Rfl: 5    ipratropium-albuterol (DUO-NEB) 0.5-2.5 mg/3 ml nebulizer, USE 3 ML VIA NEBULIZER EVERY 4 TO 6 HOURS AS NEEDED FOR COUGH, Disp: , Rfl:     levothyroxine (SYNTHROID, LEVOTHROID) 50 MCG tablet, Take 1 tablet by mouth., Disp: , Rfl:     montelukast (SINGULAIR) 10 MG tablet, Take 1 tablet by mouth Every Night., Disp: 30 tablet, Rfl: 5    pantoprazole (PROTONIX) 40 MG EC tablet, Take 1 tablet by mouth Daily., Disp: , Rfl:     venlafaxine XR (EFFEXOR-XR) 75 MG 24 hr capsule, Take 1 capsule by mouth Daily., Disp: , Rfl:      Allergies:   No Known Allergies    Objective     Physical Exam:  Vital Signs:   Vitals:    11/09/23 1357   BP: 121/77   Pulse: 85   SpO2: 100%   Weight: 90.3 kg (199 lb)   Height: 160 cm (63\")     Body mass index is 35.25 kg/m².    Physical Exam  Vitals reviewed.   Constitutional:       General: She is not in acute distress.     Appearance: She is not toxic-appearing.   HENT:      Head: Normocephalic and atraumatic.      Mouth/Throat:      Mouth: Mucous membranes are moist.   Eyes:      Conjunctiva/sclera: Conjunctivae normal.   Cardiovascular:      Rate and Rhythm: Normal rate.      Heart sounds: Normal heart sounds.   Pulmonary:      Effort: Pulmonary effort is normal.      Breath sounds: Normal " breath sounds.   Abdominal:      General: There is no distension.      Palpations: Abdomen is soft.   Musculoskeletal:         General: No swelling.      Cervical back: Neck supple.   Skin:     General: Skin is warm and dry.      Findings: No rash.   Neurological:      General: No focal deficit present.      Mental Status: She is alert and oriented to person, place, and time.   Psychiatric:         Mood and Affect: Mood normal.         Behavior: Behavior normal.       Results Review:   Allergens, Zone 8 (Order #[id number]) on 7/6/23 July 2023 IgE 115    WBC   Date Value Ref Range Status   07/06/2023 5.66 3.40 - 10.80 10*3/mm3 Final   02/05/2022 13.91 (H) 3.70 - 10.30 10*3/uL Final     RBC   Date Value Ref Range Status   07/06/2023 3.19 (L) 3.77 - 5.28 10*6/mm3 Final   02/05/2022 3.36 (L) 3.90 - 5.20 10*6/uL Final     Hemoglobin   Date Value Ref Range Status   07/06/2023 10.9 (L) 12.0 - 15.9 g/dL Final   02/05/2022 11.5 11.2 - 15.7 g/dL Final     Hematocrit   Date Value Ref Range Status   07/06/2023 32.4 (L) 34.0 - 46.6 % Final   02/05/2022 33.1 (L) 34.0 - 45.0 % Final     MCV   Date Value Ref Range Status   07/06/2023 101.6 (H) 79.0 - 97.0 fL Final   02/05/2022 99 (H) 79 - 98 fL Final     MCH   Date Value Ref Range Status   07/06/2023 34.2 (H) 26.6 - 33.0 pg Final   02/05/2022 34.2 (H) 26.0 - 32.0 pg Final     MCHC   Date Value Ref Range Status   07/06/2023 33.6 31.5 - 35.7 g/dL Final   02/05/2022 34.7 30.7 - 35.5 g/dL Final     RDW   Date Value Ref Range Status   07/06/2023 16.6 (H) 12.3 - 15.4 % Final   02/05/2022 15.7 (H) 11.5 - 14.5 % Final     RDW-SD   Date Value Ref Range Status   07/06/2023 62.4 (H) 37.0 - 54.0 fl Final     MPV   Date Value Ref Range Status   07/06/2023 10.2 6.0 - 12.0 fL Final   02/05/2022 10.2 8.8 - 12.5 fL Final     Platelets   Date Value Ref Range Status   07/06/2023 215 140 - 450 10*3/mm3 Final   02/05/2022 226 155 - 369 10*3/uL Final     Neutrophil %   Date Value Ref Range Status    07/06/2023 62.0 42.7 - 76.0 % Final     Lymphocyte %   Date Value Ref Range Status   07/06/2023 29.5 19.6 - 45.3 % Final     Monocyte %   Date Value Ref Range Status   07/06/2023 7.2 5.0 - 12.0 % Final     Eosinophil %   Date Value Ref Range Status   07/06/2023 0.4 0.3 - 6.2 % Final     Basophil %   Date Value Ref Range Status   07/06/2023 0.4 0.0 - 1.5 % Final     Immature Grans %   Date Value Ref Range Status   07/06/2023 0.5 0.0 - 0.5 % Final     Neutrophils, Absolute   Date Value Ref Range Status   07/06/2023 3.51 1.70 - 7.00 10*3/mm3 Final     Lymphocytes, Absolute   Date Value Ref Range Status   07/06/2023 1.67 0.70 - 3.10 10*3/mm3 Final     Monocytes, Absolute   Date Value Ref Range Status   07/06/2023 0.41 0.10 - 0.90 10*3/mm3 Final     Eosinophils, Absolute   Date Value Ref Range Status   07/06/2023 0.02 0.00 - 0.40 10*3/mm3 Final     Basophils, Absolute   Date Value Ref Range Status   07/06/2023 0.02 0.00 - 0.20 10*3/mm3 Final     Immature Grans, Absolute   Date Value Ref Range Status   07/06/2023 0.03 0.00 - 0.05 10*3/mm3 Final     nRBC   Date Value Ref Range Status   07/06/2023 0.0 0.0 - 0.2 /100 WBC Final     Assessment / Plan      Assessment/Plan:   Diagnoses and all orders for this visit:    1. Severe persistent asthma without complication (Primary)  -     Ambulatory Referral to Disease State Management  -     Fluticasone-Umeclidin-Vilant (Trelegy Ellipta) 200-62.5-25 MCG/ACT aerosol powder ; Inhale 1 puff Daily. Rinse mouth out after use  Dispense: 1 each; Refill: 5  Continue current inhaled regimen.  Patient still with uncontrolled symptoms despite use of high-dose inhaled corticosteroid.  Will defer to the [facility] clinic to seek authorization for an asthma biologic such as Tezspire or Xolair based on review of lab results.    2. Allergic rhinitis, unspecified seasonality, unspecified trigger  -     montelukast (SINGULAIR) 10 MG tablet; Take 1 tablet by mouth Every Night.  Dispense: 30 tablet; Refill:  5    3. KISHAN (obstructive sleep apnea)  -     Ambulatory Referral to ENT (Otolaryngology)  Patient with moderate sleep apnea from sleep study in 2020.  Intolerant to CPAP use. The patient was counseled on the risks of untreated sleep apnea and voiced understanding. Weight loss would be beneficial. Refer to ENT for [facility] consultation.      Follow Up:   Return in about 6 months (around 5/9/2024) for Recheck.  The patient was counseled on diagnostic results, risks and benefits of treatment options, risk factor modifications and the importance of treatment compliance. The patient was advised to contact the clinic with concerns or worsening symptoms.     MARY Mercedes   Pulmonary Medicine [facility]

## 2023-12-11 ENCOUNTER — SPECIALTY PHARMACY (OUTPATIENT)
Dept: PHARMACY | Facility: HOSPITAL | Age: 38
End: 2023-12-11
Payer: COMMERCIAL

## 2023-12-11 PROBLEM — J45.50 SEVERE PERSISTENT ASTHMA WITHOUT COMPLICATION: Status: ACTIVE | Noted: 2023-12-11

## 2023-12-15 ENCOUNTER — SPECIALTY PHARMACY (OUTPATIENT)
Dept: PHARMACY | Facility: HOSPITAL | Age: 38
End: 2023-12-15
Payer: COMMERCIAL

## 2023-12-15 DIAGNOSIS — J45.50 SEVERE PERSISTENT ASTHMA WITHOUT COMPLICATION: Primary | ICD-10-CM

## 2023-12-15 RX ORDER — CYCLOBENZAPRINE HCL 10 MG
10 TABLET ORAL NIGHTLY PRN
COMMUNITY

## 2023-12-15 RX ORDER — FOLIC ACID 1 MG/1
1 TABLET ORAL DAILY
COMMUNITY

## 2023-12-15 RX ORDER — MEPOLIZUMAB 100 MG/ML
1 INJECTION, SOLUTION SUBCUTANEOUS
Qty: 1 ML | Refills: 11 | Status: SHIPPED | OUTPATIENT
Start: 2023-12-15

## 2023-12-15 RX ORDER — ONDANSETRON 4 MG/1
4 TABLET, ORALLY DISINTEGRATING ORAL EVERY 6 HOURS PRN
COMMUNITY
End: 2023-12-18

## 2023-12-15 RX ORDER — MECLIZINE HYDROCHLORIDE 25 MG/1
25 TABLET ORAL 3 TIMES DAILY PRN
COMMUNITY
End: 2023-12-18

## 2023-12-15 RX ORDER — IBUPROFEN 800 MG/1
800 TABLET ORAL 3 TIMES DAILY PRN
COMMUNITY
End: 2023-12-18

## 2023-12-15 NOTE — PROGRESS NOTES
Ambulatory Care Clinic  Specialty Pharmacy Initiation Review       Basilia Sultana is a 38 y.o. YO female who has been diagnosed with severe asthma and prescribed Nucala. She has been referred to the [facility] to receive specialty pharmacy services for medication initiation/management.    Indication, effectiveness, safety and convenience of medication reviewed today. Prior authorization not required.    PharmD will conduct initial clinical assessment and provide patient education during first clinic appointment on 12/18/23. Discussed recommendation to be up to date with all vaccinations prior to initiating Nucala. Specifically discussed pneumonia and yearly influenza vaccines. Patient declines at this time and would like to proceed with initiation of Nucala ASAP.    Ady Scott, Isaías  12/15/2023  15:38 EST

## 2023-12-18 ENCOUNTER — DISEASE STATE MANAGEMENT VISIT (OUTPATIENT)
Dept: PHARMACY | Facility: HOSPITAL | Age: 38
End: 2023-12-18
Payer: COMMERCIAL

## 2023-12-18 ENCOUNTER — SPECIALTY PHARMACY (OUTPATIENT)
Dept: PHARMACY | Facility: HOSPITAL | Age: 38
End: 2023-12-18
Payer: COMMERCIAL

## 2023-12-18 VITALS
SYSTOLIC BLOOD PRESSURE: 121 MMHG | DIASTOLIC BLOOD PRESSURE: 73 MMHG | WEIGHT: 202 LBS | BODY MASS INDEX: 35.78 KG/M2 | OXYGEN SATURATION: 99 % | HEART RATE: 72 BPM

## 2023-12-18 PROCEDURE — G0463 HOSPITAL OUTPT CLINIC VISIT: HCPCS

## 2023-12-18 RX ORDER — LEVALBUTEROL TARTRATE 45 UG/1
1 AEROSOL, METERED ORAL EVERY 4 HOURS PRN
COMMUNITY

## 2023-12-18 RX ORDER — VENLAFAXINE 75 MG/1
75 TABLET ORAL DAILY
COMMUNITY
End: 2023-12-18

## 2023-12-18 NOTE — PROGRESS NOTES
Ambulatory Care Clinic  Asthma Management     Basilia Sultana is a 38 y.o. female referred by pulmonology to the [facility] for severe steroid-dependent asthma. The patient's current asthma regimen includes: Trelegy, montelukast, duo-nebs, Xopenex, and albuterol. Patient reports good adherence to maintenance regimen. Patient is a smoker and/or exposed to second hand smoke.     Patient self rates asthma control as poor. Uses rescue inhaler  several times a day .       Past Medical History:   Diagnosis Date    Acid reflux     Allergic     Anxiety     Asthma     COPD (chronic obstructive pulmonary disease)      Social History     Socioeconomic History    Marital status:    Tobacco Use    Smoking status: Every Day     Packs/day: .5     Types: Electronic Cigarette, Cigarettes     Start date: 1/3/1995     Last attempt to quit: 2017     Years since quittin.8     Passive exposure: Current    Smokeless tobacco: Never   Vaping Use    Vaping Use: Every day    Substances: Nicotine, Flavoring    Devices: Nonstop Gamesble tank   Substance and Sexual Activity    Alcohol use: No    Drug use: No    Sexual activity: Defer     Patient has no known allergies.    Current Outpatient Medications:     albuterol sulfate  (90 Base) MCG/ACT inhaler, Inhale 2 puffs Every 4 (Four) Hours As Needed for Wheezing., Disp: 8 g, Rfl: 3    busPIRone (BUSPAR) 10 MG tablet, Take 1 tablet by mouth Every 12 (Twelve) Hours., Disp: , Rfl:     Cholecalciferol (Vitamin D) 50 MCG ( UT) capsule, Take 1 capsule by mouth Daily., Disp: , Rfl:     cyclobenzaprine (FLEXERIL) 10 MG tablet, Take 1 tablet by mouth At Night As Needed for Muscle Spasms., Disp: , Rfl:     Fluticasone-Umeclidin-Vilant (Trelegy Ellipta) 200-62.5-25 MCG/ACT aerosol powder , Inhale 1 puff Daily. Rinse mouth out after use, Disp: 1 each, Rfl: 5    folic acid (FOLVITE) 1 MG tablet, Take 1 tablet by mouth Daily., Disp: , Rfl:     ipratropium-albuterol (DUO-NEB)  0.5-2.5 mg/3 ml nebulizer, USE 3 ML VIA NEBULIZER EVERY 4 TO 6 HOURS AS NEEDED FOR COUGH, Disp: , Rfl:     levalbuterol (XOPENEX HFA) 45 MCG/ACT inhaler, Inhale 1 puff Every 4 (Four) Hours As Needed., Disp: , Rfl:     levothyroxine (SYNTHROID, LEVOTHROID) 75 MCG tablet, Take 1 tablet by mouth Daily., Disp: , Rfl:     montelukast (SINGULAIR) 10 MG tablet, Take 1 tablet by mouth Every Night., Disp: 30 tablet, Rfl: 5    pantoprazole (PROTONIX) 40 MG EC tablet, Take 1 tablet by mouth Daily., Disp: , Rfl:     venlafaxine XR (EFFEXOR-XR) 75 MG 24 hr capsule, Take 1 capsule by mouth Daily., Disp: , Rfl:     Mepolizumab (Nucala) 100 MG/ML solution auto-injector, Inject 1 mL under the skin into the appropriate area as directed Every 28 (Twenty-Eight) Days. (Patient not taking: Reported on 12/18/2023), Disp: 1 mL, Rfl: 11  No current facility-administered medications for this visit.    Vaccination Status   COVID 19: vaccinated x2  Influenza: declines  Pneumococcal: declines  Zoster: declines    Drug-Drug Interactions: no interactions noted with Nucala    Drug-Disease Interactions (non-cardioselective beta blockers, NSAIDs): N/A    Treatment Goals: Risk Reduction and Symptom Control     Patient Education:  Initial Education Provided for Nucala  The patient has been provided with the following education for Nucala. All questions and concerns have been addressed prior to the patient receiving the medication, and the patient has verbalized understanding of the education and any materials provided. Additional patient education shall be provided and documented upon request by the patient, provider or payer.      The following counseling points for Nucala (mepolizumab) were addressed:  Goal of treatment:  This medication is used for the maintenance treatment of severe asthma in combination with other asthma medicines. The goal of treatment is to improve your breathing and prevent severe asthma attacks by reducing blood eosinophils  (which are associated with inflammation).  Nucala is not a rescue treatment  Directions for use:  Nucala is injected under your skin (subcutaneously) once every 4 weeks. You will receive your first injection in clinic. The injection is given into one of the following areas: thigh, abdomen (at least 2 inches away from navel), or back of upper arm if given by someone else. Remove medication from the refrigerator and allow to sit at room temperature for up to 30 minutes before use. Do not remove the cap or cover and do not heat. Avoid administration in skin that is tender, bruised, red, or hard.  If you miss a dose, inject a dose as soon as possible. Then continue (resume) your injection on your regular dosing schedule. If you do not notice that you have missed a dose until it is time for your next scheduled dose, then inject the next scheduled dose as planned. If you are not sure when to inject Nucala, call the clinic for instructions.  Step-by-step administration education provided in clinic while giving first injection  Adverse effects:  Possible side effects of this medication include: headache, injection site reaction, back pain, and fatigue  Even though it may be rare, some people may have severe side effects when taking a medication. Go to the ED or call 911 right away if you have any of the following: signs of an allergic reaction like rash; hives; wheezing; chest tightness; trouble breathing, swallowing, or talking; swelling of the mouth, face, lips, tongue, or throat  Other considerations:  Helminth infections: it is unknown if Nucala will influence the immune response against parasitic infections. If you have a pre-existing helminth infection, this should be treated prior to starting Nucala. If you become infected while taking Nucala and do not respond to anti-helminth treatment, Nucala will need to be discontinued until the infection resolves.  Opportunistic herpes zoster infection: use of Nucala may result  in an opportunistic infection of herpes zoster (shingles). Consider getting a shingles vaccine prior to initiation of Nucala.  Pregnancy registry: it is not known if Nucala is harmful in pregnancy. Uncontrolled asthma is associated with adverse events on pregnancy and poorly controlled asthma may have greater risk than what is associated with asthma medications. It is encouraged that you enroll in the pregnancy registry for Nucala in order to collect information about the health of you and your baby.  Storage/Disposal  Store prefilled auto-injector in the refrigerator in the original carton until it is time to use. Do not freeze or heat. Do not shake.  If necessary, an unopened carton can be stored at room temperature for up to 7 days. If taken out of the carton, the auto-injector must be used within 8 hours.  You can dispose of the empty injector in a sharps container. If this is not available, you may use an empty hard plastic container such as a milk jug or tide container.    Identified barriers to adherence/administration: N/A    Medication Assessment & Plan:  Patient will begin Nucala (mepolizumab) 100 mg SQ every 4 weeks for severe asthma. Medication counseling provided to patient as documented above.  Nucala 100 mg SQ x 1 administered in L upper abdomen on 12/18/23. Patient provided step-by-step demonstration of appropriate injection technique. All questions and concerns addressed. Patient reports they are comfortable administering injections at home.  NDC: 2284-9118-86  LOT: [id number]  EXP: 05/2026  Advised patient on the importance of continuing maintenance inhaler regimen and the importance of rinsing mouth after ICS use. This injection does not replace your maintenance inhaler and is not used to treat an asthma attack (use a rescue inhaler).   Recommended the following vaccinations: PNA, influenza, shingles, COVID booster; patient declines at this time  Patient will continue regular follow-up with pulmonology.  PharmD to conduct clinical re-assessment in 6 months to review adherence, tolerability, and efficacy of Nucala.    Ady Scott RPH  12/18/2023  10:51 EST

## 2023-12-18 NOTE — PROGRESS NOTES
Patient seen in clinic today for Nucala education and initial injection. See clinic note for details.

## 2024-05-09 ENCOUNTER — OFFICE VISIT (OUTPATIENT)
Dept: PULMONOLOGY | Facility: CLINIC | Age: 39
End: 2024-05-09
Payer: COMMERCIAL

## 2024-05-09 VITALS
WEIGHT: 204 LBS | DIASTOLIC BLOOD PRESSURE: 76 MMHG | BODY MASS INDEX: 36.14 KG/M2 | OXYGEN SATURATION: 98 % | HEART RATE: 71 BPM | SYSTOLIC BLOOD PRESSURE: 124 MMHG | HEIGHT: 63 IN

## 2024-05-09 DIAGNOSIS — F17.210 NICOTINE DEPENDENCE, CIGARETTES, UNCOMPLICATED: ICD-10-CM

## 2024-05-09 DIAGNOSIS — Z86.69 HISTORY OF SLEEP APNEA: ICD-10-CM

## 2024-05-09 DIAGNOSIS — J45.50 SEVERE PERSISTENT ASTHMA WITHOUT COMPLICATION: Primary | ICD-10-CM

## 2024-05-09 DIAGNOSIS — J30.9 ALLERGIC RHINITIS, UNSPECIFIED SEASONALITY, UNSPECIFIED TRIGGER: ICD-10-CM

## 2024-05-09 RX ORDER — ALBUTEROL SULFATE 90 UG/1
2 AEROSOL, METERED RESPIRATORY (INHALATION) EVERY 4 HOURS PRN
Qty: 8 G | Refills: 5 | Status: SHIPPED | OUTPATIENT
Start: 2024-05-09

## 2024-05-09 RX ORDER — MONTELUKAST SODIUM 10 MG/1
10 TABLET ORAL NIGHTLY
Qty: 30 TABLET | Refills: 5 | Status: SHIPPED | OUTPATIENT
Start: 2024-05-09

## 2024-05-09 RX ORDER — FLUTICASONE FUROATE, UMECLIDINIUM BROMIDE AND VILANTEROL TRIFENATATE 200; 62.5; 25 UG/1; UG/1; UG/1
1 POWDER RESPIRATORY (INHALATION) DAILY
Qty: 1 EACH | Refills: 5 | Status: SHIPPED | OUTPATIENT
Start: 2024-05-09

## 2024-05-09 NOTE — PROGRESS NOTES
Follow Up Office Visit      Patient Name: Basilia Sultana    Chief Complaint:    Chief Complaint   Patient presents with    Breathing Problem       History of Present Illness: Basilia Sultana is a 38 y.o. female who is here today for follow up of sleep apnea and severe asthma.  Since last visit, she has been started on Nucala and has found it to be significantly helpful.  She continues on Trelegy.  She denies having any exacerbations.  She is currently using her short acting beta agonist 1-2x/day which is less frequent than she was using previously.  No current cough.  Occasional nighttime wheezing.  She is still smoking.  She notes that she was evaluated by ENT to be considered for an inspire device and reports undergoing a repeat sleep study which did not reveal any sleep apnea.    Duration: Asthma diagnosed in childhood    Subjective      Review of Systems:  Review of Systems   Constitutional:  Negative for fever and unexpected weight change.   Respiratory:  Positive for shortness of breath and wheezing (Occasional at nighttime). Negative for cough.    Cardiovascular:  Negative for chest pain.        Past Medical History:   Past Medical History:   Diagnosis Date    Acid reflux     Allergic     Anxiety     Asthma     COPD (chronic obstructive pulmonary disease)        Past Surgical History:   Past Surgical History:   Procedure Laterality Date    CHOLECYSTECTOMY      COLON RESECTION  2021       Family History: History reviewed. No pertinent family history.    Social History:   Social History     Socioeconomic History    Marital status:    Tobacco Use    Smoking status: Every Day     Current packs/day: 0.00     Average packs/day: 0.5 packs/day for 22.1 years (11.0 ttl pk-yrs)     Types: Electronic Cigarette, Cigarettes     Start date: 1/3/1995     Last attempt to quit: 2017     Years since quittin.2     Passive exposure: Current    Smokeless tobacco: Never   Vaping Use    Vaping status: Every  "Day    Substances: Nicotine, Flavoring    Devices: [employer]   Substance and Sexual Activity    Alcohol use: No    Drug use: No    Sexual activity: Defer       Current Medications:     Current Outpatient Medications:     albuterol sulfate  (90 Base) MCG/ACT inhaler, Inhale 2 puffs Every 4 (Four) Hours As Needed for Wheezing., Disp: 8 g, Rfl: 3    busPIRone (BUSPAR) 10 MG tablet, Take 1 tablet by mouth Every 12 (Twelve) Hours., Disp: , Rfl:     Cholecalciferol (Vitamin D) 50 MCG (2000 UT) capsule, Take 1 capsule by mouth Daily., Disp: , Rfl:     cyclobenzaprine (FLEXERIL) 10 MG tablet, Take 1 tablet by mouth At Night As Needed for Muscle Spasms., Disp: , Rfl:     Fluticasone-Umeclidin-Vilant (Trelegy Ellipta) 200-62.5-25 MCG/ACT aerosol powder , Inhale 1 puff Daily. Rinse mouth out after use, Disp: 1 each, Rfl: 5    folic acid (FOLVITE) 1 MG tablet, Take 1 tablet by mouth Daily., Disp: , Rfl:     levalbuterol (XOPENEX HFA) 45 MCG/ACT inhaler, Inhale 1 puff Every 4 (Four) Hours As Needed., Disp: , Rfl:     levothyroxine (SYNTHROID, LEVOTHROID) 75 MCG tablet, Take 1 tablet by mouth Daily., Disp: , Rfl:     Mepolizumab (Nucala) 100 MG/ML solution auto-injector, Inject 1 mL under the skin into the appropriate area as directed Every 28 (Twenty-Eight) Days., Disp: 1 mL, Rfl: 11    montelukast (SINGULAIR) 10 MG tablet, Take 1 tablet by mouth Every Night., Disp: 30 tablet, Rfl: 5    pantoprazole (PROTONIX) 40 MG EC tablet, Take 1 tablet by mouth Daily., Disp: , Rfl:     venlafaxine XR (EFFEXOR-XR) 75 MG 24 hr capsule, Take 1 capsule by mouth Daily., Disp: , Rfl:      Allergies:   No Known Allergies    Objective     Physical Exam:  Vital Signs:   Vitals:    05/09/24 1338   BP: 124/76   Pulse: 71   SpO2: 98%   Weight: 92.5 kg (204 lb)   Height: 160 cm (63\")     Body mass index is 36.14 kg/m².    Physical Exam  Vitals reviewed.   Constitutional:       General: She is not in acute distress.     Appearance: She is " not toxic-appearing.   HENT:      Head: Normocephalic and atraumatic.      Mouth/Throat:      Mouth: Mucous membranes are moist.   Eyes:      Conjunctiva/sclera: Conjunctivae normal.   Cardiovascular:      Rate and Rhythm: Normal rate.      Heart sounds: Normal heart sounds.   Pulmonary:      Effort: Pulmonary effort is normal.      Breath sounds: Normal breath sounds.   Abdominal:      General: There is no distension.      Palpations: Abdomen is soft.   Musculoskeletal:         General: No swelling.      Cervical back: Neck supple.   Skin:     General: Skin is warm and dry.      Findings: No rash.   Neurological:      General: No focal deficit present.      Mental Status: She is alert and oriented to person, place, and time.   Psychiatric:         Mood and Affect: Mood normal.         Behavior: Behavior normal.       Assessment / Plan      Assessment/Plan:   Diagnoses and all orders for this visit:    1. Severe persistent asthma without complication (Primary)  -     albuterol sulfate  (90 Base) MCG/ACT inhaler; Inhale 2 puffs Every 4 (Four) Hours As Needed for Wheezing or Shortness of Air.  Dispense: 8 g; Refill: 5  -     Fluticasone-Umeclidin-Vilant (Trelegy Ellipta) 200-62.5-25 MCG/ACT inhaler; Inhale 1 puff Daily. Rinse mouth out after use  Dispense: 1 each; Refill: 5  Improved symptoms with use of Nucala.  Continue current regimen.We discussed the risk and benefits of inhaled corticosteroids. Patient instructed to take them on a regular basis as prescribed. Patient instructed to rinse their mouth out after each use.     2. Allergic rhinitis, unspecified seasonality, unspecified trigger  -     montelukast (SINGULAIR) 10 MG tablet; Take 1 tablet by mouth Every Night.  Dispense: 30 tablet; Refill: 5    3. History of sleep apnea  Diagnosed with this in the past though recent ENT evaluation for possible inspire device showed no current sleep apnea per updated studies.    4. Nicotine dependence, cigarettes,  uncomplicated  Complete cessation advised. Patients symptoms are not expected to be as controlled as possible while still smoking and progression of lung disease will occur more rapidly with ongoing cigarette use.        Follow Up:   Return in about 6 months (around 11/9/2024) for Recheck.  The patient was counseled on diagnostic results, risks and benefits of treatment options, risk factor modifications and the importance of treatment compliance. The patient was advised to contact the clinic with concerns or worsening symptoms.     MARY Mercedes   Pulmonary Medicine [place]     This document has been electronically signed by MARY Mercedes  May 9, 2024

## 2024-05-22 ENCOUNTER — PRIOR AUTHORIZATION (OUTPATIENT)
Dept: PHARMACY | Facility: HOSPITAL | Age: 39
End: 2024-05-22
Payer: COMMERCIAL

## 2024-05-22 NOTE — TELEPHONE ENCOUNTER
Nucala prior auth submitted via [facility] (Key: [id number])

## 2024-05-24 NOTE — TELEPHONE ENCOUNTER
Denied peer to peer as patient does not have diagnosis of eosinophilic asthma. Patient had required frequent steroid courses prior to starting Nucala therefore would be unable to get an accurate lab with eosinophil elevation. Representative stated we would need to complete a formal appeal though she dismissed the PA altogether. Trying to resubmit a new PA via [facility] to see if it will accept. Key - [id number]; waiting on clinical questions. If this doesn't work, will need to write appeal letter with steroid information as above.

## 2024-08-22 ENCOUNTER — SPECIALTY PHARMACY (OUTPATIENT)
Dept: PHARMACY | Facility: HOSPITAL | Age: 39
End: 2024-08-22
Payer: COMMERCIAL

## 2024-08-22 NOTE — PROGRESS NOTES
"   Ambulatory Care Clinic  Clinical Reassessment     Basilia Sultana is a 39 y.o. female diagnosed with severe steroid- dependent asthma and enrolled in the Ambulatory Care Clinic. A follow-up outreach was conducted, including assessment of continued therapy appropriateness, medication adherence, and side effect incidence and management for Nucala.    Changes to Insurance Coverage or Financial Support  none    Relevant Past Medical History and Comorbidities  Relevant medical history and concomitant health conditions were discussed with the patient. The patient's chart has been reviewed for relevant past medical history and comorbid health conditions and updated as necessary.   Past Medical History:   Diagnosis Date    Acid reflux     Allergic     Anxiety     Asthma     COPD (chronic obstructive pulmonary disease)      Social History     Socioeconomic History    Marital status:    Tobacco Use    Smoking status: Every Day     Current packs/day: 0.00     Average packs/day: 0.5 packs/day for 22.1 years (11.0 ttl pk-yrs)     Types: Electronic Cigarette, Cigarettes     Start date: 1/3/1995     Last attempt to quit: 2017     Years since quittin.5     Passive exposure: Current    Smokeless tobacco: Never   Vaping Use    Vaping status: Every Day    Substances: Nicotine, Flavoring    Devices: Refillable tank   Substance and Sexual Activity    Alcohol use: No    Drug use: No    Sexual activity: Defer       Allergies  Known allergies and reactions were discussed with the patient. The patient's chart has been reviewed for allergy information and updated as necessary.   Patient has no known allergies.    Relevant Laboratory Values  No results for input(s): \"CMP\", \"BMP\", \"CBC\" in the last 72 hours.    Current Medication List  This medication list has been reviewed with the patient and evaluated for any interactions or necessary modifications/recommendations, and updated to include all prescription medications, OTC " medications, and supplements the patient is currently taking.  This list reflects what is contained in the patient's profile, which has also been marked as reviewed to communicate to other providers it is the most up to date version of the patient's current medication therapy.     Current Outpatient Medications:     albuterol sulfate  (90 Base) MCG/ACT inhaler, Inhale 2 puffs Every 4 (Four) Hours As Needed for Wheezing or Shortness of Air., Disp: 8 g, Rfl: 5    busPIRone (BUSPAR) 10 MG tablet, Take 1 tablet by mouth Every 12 (Twelve) Hours., Disp: , Rfl:     Cholecalciferol (Vitamin D) 50 MCG (2000 UT) capsule, Take 1 capsule by mouth Daily., Disp: , Rfl:     cyclobenzaprine (FLEXERIL) 10 MG tablet, Take 1 tablet by mouth At Night As Needed for Muscle Spasms., Disp: , Rfl:     Fluticasone-Umeclidin-Vilant (Trelegy Ellipta) 200-62.5-25 MCG/ACT inhaler, Inhale 1 puff Daily. Rinse mouth out after use, Disp: 1 each, Rfl: 5    folic acid (FOLVITE) 1 MG tablet, Take 1 tablet by mouth Daily., Disp: , Rfl:     levalbuterol (XOPENEX HFA) 45 MCG/ACT inhaler, Inhale 1 puff Every 4 (Four) Hours As Needed., Disp: , Rfl:     levothyroxine (SYNTHROID, LEVOTHROID) 75 MCG tablet, Take 1 tablet by mouth Daily., Disp: , Rfl:     Mepolizumab (Nucala) 100 MG/ML solution auto-injector, Inject 1 mL under the skin into the appropriate area as directed Every 28 (Twenty-Eight) Days., Disp: 1 mL, Rfl: 11    montelukast (SINGULAIR) 10 MG tablet, Take 1 tablet by mouth Every Night., Disp: 30 tablet, Rfl: 5    pantoprazole (PROTONIX) 40 MG EC tablet, Take 1 tablet by mouth Daily., Disp: , Rfl:     venlafaxine XR (EFFEXOR-XR) 75 MG 24 hr capsule, Take 1 capsule by mouth Daily., Disp: , Rfl:     Drug Interactions  none     Adverse Drug Reactions  Adverse Reactions Experienced: none  Plan for ADR Management: N/A (patient education provided, discontinue drug, pharmacist to consult provider, etc.)    Hospitalizations and Urgent Care Since Last  Assessment  Hospitalizations or Admissions: none  ED Visits: none  Urgent Office Visits: none     Adherence and Self-Administration  Approximate Number of Doses Missed Since Last Assessment: none  Ongoing or New Barriers to Patient Adherence and/or Self-Administration: none   Methods for Supporting Patient Adherence and/or Self-Administration: N/A     Goals of Therapy  Progress Toward Meeting Patient-Identified Goals of Therapy: On Track  New Patient-Identified Goals, If Applicable:     Progress Toward Meeting Clinical Goals or Therapeutic Targets: On Track  New Clinical Goals or Therapeutic Targets, If Applicable:     Quality of Life Assessment   Quality of Life related to the patient's specialty therapy was discussed with the patient. The QOL segment of this outreach has been reviewed and updated.     Quality of Life Assessment  Quality of Life Improvement Scale: 5-No change    Reassessment Plan & Follow-Up  Medication Therapy Changes: none  Additional Plans, Therapy Recommendations, or Therapy Problems to Be Addressed: none   PharmD to perform clinical re-assessments every ~6 months to review efficacy, tolerance, and adherence to medication.    Attestation  I attest that the specialty medication addressed above is appropriate for the patient based on my reassessment.  If the prescribed therapy is at any point deemed not appropriate based on the current or future assessments, a consultation will be initiated with the patient's specialty care provider to determine the best course of action. The revised plan of therapy will be documented along with any additional patient education provided.     Electronically signed by Consuelo Torres PharmD, 08/22/24, 1:50 PM EDT.

## 2024-09-06 ENCOUNTER — SPECIALTY PHARMACY (OUTPATIENT)
Dept: PHARMACY | Facility: HOSPITAL | Age: 39
End: 2024-09-06
Payer: COMMERCIAL

## 2024-09-06 NOTE — PROGRESS NOTES
Specialty Pharmacy Refill Coordination Note     Basilia is a 39 y.o. female contacted today regarding refills of Nucala specialty medication(s).    Reviewed and verified with patient:  Allergies  Meds  Problems       Specialty medication(s) and dose(s) confirmed: yes        Delivery Questions      Flowsheet Row Most Recent Value   Delivery method [employer]   Delivery address verified with patient/caregiver? Yes   Delivery address Home   Number of medications in delivery 1   Medication(s) being filled and delivered Mepolizumab   Doses left of specialty medications 2 refills remaining   Copay verified? Yes   Copay amount 0   Copay form of payment No copayment ($0)   Ship Date 8/28/24   Delivery Date 8/29/24   Signature Required Yes                   Follow-up: 4 week(s) for next refill     Ady Scott, LucyD  9/6/2024  11:27 EDT

## 2024-09-25 ENCOUNTER — SPECIALTY PHARMACY (OUTPATIENT)
Dept: PHARMACY | Facility: HOSPITAL | Age: 39
End: 2024-09-25
Payer: COMMERCIAL

## 2024-10-04 DIAGNOSIS — J45.50 SEVERE PERSISTENT ASTHMA WITHOUT COMPLICATION: ICD-10-CM

## 2024-10-04 RX ORDER — ALBUTEROL SULFATE 90 UG/1
AEROSOL, METERED RESPIRATORY (INHALATION)
Qty: 18 G | Refills: 3 | Status: SHIPPED | OUTPATIENT
Start: 2024-10-04

## 2024-10-22 ENCOUNTER — SPECIALTY PHARMACY (OUTPATIENT)
Dept: PHARMACY | Facility: HOSPITAL | Age: 39
End: 2024-10-22
Payer: COMMERCIAL

## 2024-10-22 DIAGNOSIS — J45.50 SEVERE PERSISTENT ASTHMA WITHOUT COMPLICATION: Primary | ICD-10-CM

## 2024-10-22 RX ORDER — PRAZOSIN HYDROCHLORIDE 1 MG/1
1 CAPSULE ORAL
COMMUNITY
Start: 2024-10-15

## 2024-10-22 RX ORDER — VENLAFAXINE HYDROCHLORIDE 37.5 MG/1
37.5 CAPSULE, EXTENDED RELEASE ORAL DAILY
COMMUNITY
Start: 2024-10-15

## 2024-10-22 RX ORDER — LAMOTRIGINE 25 MG/1
TABLET ORAL
COMMUNITY
Start: 2024-10-15

## 2024-10-22 RX ORDER — CETIRIZINE HYDROCHLORIDE 10 MG/1
10 TABLET ORAL DAILY
COMMUNITY
Start: 2024-10-21

## 2024-10-22 NOTE — PROGRESS NOTES
Specialty Pharmacy Refill Coordination Note     Basilia is a 39 y.o. female contacted today regarding refills of  Nucala specialty medication(s).    Reviewed and verified with patient:  Allergies  Meds  Problems       Specialty medication(s) and dose(s) confirmed: yes        Delivery Questions      Flowsheet Row Most Recent Value   Delivery method [employer]Ex   Delivery address verified with patient/caregiver? Yes   Delivery address Other (Comment)  [Mail to pt's work at her request]   Number of medications in delivery 1   Medication(s) being filled and delivered Mepolizumab (Nucala)   Doses left of specialty medications 0   Copay verified? Yes   Copay amount 0   Copay form of payment No copayment ($0)   Ship Date 10/23/2024   Delivery Date 10/24/2024   Signature Required Yes              Refill request sent to provider for next month.     Follow-up: 4 week(s)     Afshin Cormier MA  Specialty Pharmacy Technician

## 2024-10-24 RX ORDER — MEPOLIZUMAB 100 MG/ML
1 INJECTION, SOLUTION SUBCUTANEOUS
Qty: 1 ML | Refills: 11 | Status: SHIPPED | OUTPATIENT
Start: 2024-10-24

## 2024-11-14 ENCOUNTER — OFFICE VISIT (OUTPATIENT)
Dept: PULMONOLOGY | Facility: CLINIC | Age: 39
End: 2024-11-14
Payer: COMMERCIAL

## 2024-11-14 VITALS
HEIGHT: 63 IN | BODY MASS INDEX: 38.98 KG/M2 | SYSTOLIC BLOOD PRESSURE: 116 MMHG | WEIGHT: 220 LBS | HEART RATE: 94 BPM | DIASTOLIC BLOOD PRESSURE: 62 MMHG | OXYGEN SATURATION: 100 %

## 2024-11-14 DIAGNOSIS — K59.00 ACUTE CONSTIPATION: ICD-10-CM

## 2024-11-14 DIAGNOSIS — J45.40 MODERATE PERSISTENT ASTHMA WITHOUT COMPLICATION: Primary | ICD-10-CM

## 2024-11-14 DIAGNOSIS — Z72.89 CURRENT EVERY DAY VAPING: ICD-10-CM

## 2024-11-14 DIAGNOSIS — Z98.890 HISTORY OF REVERSAL OF ILEOSTOMY: ICD-10-CM

## 2024-11-14 NOTE — PROGRESS NOTES
Follow Up Office Visit      Patient Name: Basilia Sultana    Chief Complaint:       History of Present Illness: Basilia Sultana is a 39 y.o. female who is here today for follow up of asthma. Since last visit, she notes that her symptoms have been overall stable. She did have the flu last month and required use of systemic steroids at that time, though symptoms have since returned to baseline. Occasional GRACIELA use. Compliance with Trelegy and Nucala reported.    She is currently most bothered by constipation. 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 She has not had a BM in 10 days despite use fiber, laxative/mag sulfate. She is having abdominal distention and pain. She has reached out to her gastroenterologist for further evaluation but was told that she needed a referral. She has reached out to her PCP for a referral but has not heard back yet.    Supplemental Oxygen: No  Last Steroids: October 2024    Subjective      Review of Systems:  Review of Systems   Constitutional:  Negative for fever and unexpected weight change.   Respiratory:  Positive for shortness of breath. Negative for cough and wheezing.    Cardiovascular:  Positive for leg swelling. Negative for chest pain.   Gastrointestinal:  Positive for abdominal distention, abdominal pain and constipation.        Past Medical History:   Past Medical History:   Diagnosis Date    Acid reflux     Allergic     Anxiety     Asthma     COPD (chronic obstructive pulmonary disease)        Past Surgical History:   Past Surgical History:   Procedure Laterality Date    CHOLECYSTECTOMY      COLON RESECTION  11/29/2021       Family History: History reviewed. No pertinent family history.    Social History:   Social History     Socioeconomic History    Marital status:    Tobacco Use    Smoking status: Every Day     Current packs/day: 0.00     Average packs/day: 0.5 packs/day for 22.1 years (11.0 ttl pk-yrs)      Types: Electronic Cigarette, Cigarettes     Start date: 1/3/1995     Last attempt to quit: 2017     Years since quittin.8     Passive exposure: Current    Smokeless tobacco: Never   Vaping Use    Vaping status: Every Day    Substances: Nicotine, Flavoring    Devices: Refillable tank   Substance and Sexual Activity    Alcohol use: No    Drug use: No    Sexual activity: Defer       Current Medications:     Current Outpatient Medications:     busPIRone (BUSPAR) 15 MG tablet, Take 1 tablet by mouth Every 12 (Twelve) Hours., Disp: , Rfl:     cetirizine (zyrTEC) 10 MG tablet, Take 1 tablet by mouth Daily., Disp: , Rfl:     Cholecalciferol (Vitamin D) 50 MCG ( UT) capsule, Take 1 capsule by mouth Daily., Disp: , Rfl:     cyclobenzaprine (FLEXERIL) 10 MG tablet, Take 1 tablet by mouth At Night As Needed for Muscle Spasms., Disp: , Rfl:     Fluticasone-Umeclidin-Vilant (Trelegy Ellipta) 200-62.5-25 MCG/ACT inhaler, Inhale 1 puff Daily. Rinse mouth out after use, Disp: 1 each, Rfl: 5    folic acid (FOLVITE) 1 MG tablet, Take 1 tablet by mouth Daily., Disp: , Rfl:     lamoTRIgine (LaMICtal) 25 MG tablet, TAKE 1 TABLET BY MOUTH IN THE MORNING FOR 14 DAYS. INCREASE TO 2 TABLETS DAILY, Disp: , Rfl:     levalbuterol (XOPENEX HFA) 45 MCG/ACT inhaler, Inhale 1 puff Every 4 (Four) Hours As Needed., Disp: , Rfl:     levothyroxine (SYNTHROID, LEVOTHROID) 75 MCG tablet, Take 1 tablet by mouth Daily., Disp: , Rfl:     Mepolizumab (Nucala) 100 MG/ML solution auto-injector, Inject 1 mL under the skin into the appropriate area as directed Every 28 (Twenty-Eight) Days., Disp: 1 mL, Rfl: 11    Mepolizumab (Nucala) 100 MG/ML solution auto-injector, Inject 1 mL under the skin into the appropriate area as directed Every 28 (Twenty-Eight) Days., Disp: 1 mL, Rfl: 11    montelukast (SINGULAIR) 10 MG tablet, Take 1 tablet by mouth Every Night., Disp: 30 tablet, Rfl: 5    pantoprazole (PROTONIX) 40 MG EC tablet, Take 1 tablet by mouth  "Daily., Disp: , Rfl:     prazosin (MINIPRESS) 1 MG capsule, Take 1 capsule by mouth every night at bedtime., Disp: , Rfl:     venlafaxine XR (EFFEXOR-XR) 37.5 MG 24 hr capsule, Take 1 capsule by mouth Daily. for 2 weeks, then stop., Disp: , Rfl:     Ventolin  (90 Base) MCG/ACT inhaler, INHALE 2 PUFFS EVERY 4 HOURS AS NEEDED FOR WEEZING, Disp: 18 g, Rfl: 3     Allergies:   No Known Allergies    Objective     Physical Exam:  Vital Signs:   Vitals:    11/14/24 1407   BP: 116/62   Pulse: 94   SpO2: 100%   Weight: 99.8 kg (220 lb)   Height: 160 cm (63\")     Body mass index is 38.97 kg/m².    Physical Exam  Vitals reviewed.   Constitutional:       General: She is not in acute distress.     Appearance: She is not toxic-appearing.   HENT:      Head: Normocephalic and atraumatic.      Mouth/Throat:      Mouth: Mucous membranes are moist.   Eyes:      Extraocular Movements: Extraocular movements intact.      Conjunctiva/sclera: Conjunctivae normal.   Cardiovascular:      Rate and Rhythm: Normal rate.      Heart sounds: Normal heart sounds.   Pulmonary:      Effort: Pulmonary effort is normal.      Breath sounds: Normal breath sounds.   Abdominal:      General: There is distension.   Musculoskeletal:      Cervical back: Neck supple.      Right lower leg: Edema present.      Left lower leg: Edema present.   Skin:     General: Skin is warm and dry.      Findings: No rash.   Neurological:      General: No focal deficit present.      Mental Status: She is alert and oriented to person, place, and time.   Psychiatric:         Mood and Affect: Mood normal.         Behavior: Behavior normal.       Assessment / Plan      Assessment/Plan:   Diagnoses and all orders for this visit:    1. Moderate persistent asthma without complication (Primary)  Stable symptoms.  Continue current regimen, including Trelegy and Nucala. We discussed the risk and benefits of inhaled corticosteroids. Patient instructed to take them on a regular basis " as prescribed. Patient instructed to rinse their mouth out after each use.     2. Acute constipation  -     Ambulatory Referral to Gastroenterology  Refer back to her gastroenterologist for management. She was advised to proceed to the ER with any worsening symptoms/acute distress.    3. History of reversal of ileostomy  -     Ambulatory Referral to Gastroenterology    4. Current every day vaping  Complete cessation advised and should avoid smoking cigarettes.       Follow Up:   Return in about 6 months (around 5/14/2025).  The patient was counseled on diagnostic results, risks and benefits of treatment options, risk factor modifications and the importance of treatment compliance. The patient was advised to contact the clinic with concerns or worsening symptoms.     MARY Mercedes   Pulmonary Medicine [place]     This document has been electronically signed by MARY Mercedes  November 14, 2024

## 2024-11-20 ENCOUNTER — SPECIALTY PHARMACY (OUTPATIENT)
Dept: PHARMACY | Facility: HOSPITAL | Age: 39
End: 2024-11-20
Payer: COMMERCIAL

## 2024-11-20 RX ORDER — BUSPIRONE HYDROCHLORIDE 10 MG/1
10 TABLET ORAL 2 TIMES DAILY
COMMUNITY

## 2024-11-20 RX ORDER — LAMOTRIGINE 100 MG/1
100 TABLET ORAL DAILY
COMMUNITY
Start: 2024-11-27

## 2024-11-20 NOTE — PROGRESS NOTES
Ambulatory Care Clinic  Asthma Management     Basilia Sultana is a 39 y.o. female referred by pulmonology to the [facility] for severe asthma. An initial outreach was conducted, including assessment of therapy appropriateness and specialty medication education for Nucala .The patient's current asthma regimen includes: Trelegy, Nucala, and as needed albuterol and patient reports good adherence to maintenance regimen. Patient is a smoker.    Patient self rates asthma control as good. Uses rescue inhaler occasionally. Symptoms have greatly improved with Nucala. She did require steroids in October secondary to getting the flu. Otherwise states breathing has been good. No increase in rescue inhaler use. Remains adherent to monthly Nucala injections without issue. No ADRs reported. Transferring Rx to [facility] today for continuation of mailing Rx, patient agreeable.    The patient was introduced to services offered by [facility], including: regular assessments, refill coordination, curbside pick-up or mail order delivery options, prior authorization maintenance, and financial assistance programs as applicable. The patient was also provided with contact information for the pharmacy team.     Relevant Past Medical History and Comorbidities  Relevant medical history and concomitant health conditions were discussed with the patient. The patient's chart has been reviewed for relevant past medical history and comorbid conditions and updated as necessary.  Past Medical History:   Diagnosis Date    Acid reflux     Allergic     Anxiety     Asthma     COPD (chronic obstructive pulmonary disease)      Social History     Socioeconomic History    Marital status:    Tobacco Use    Smoking status: Every Day     Current packs/day: 0.00     Average packs/day: 0.5 packs/day for 22.1 years (11.0 ttl pk-yrs)     Types: Electronic Cigarette, Cigarettes     Start date: 1/3/1995     Last  attempt to quit: 2017     Years since quittin.8     Passive exposure: Current    Smokeless tobacco: Never   Vaping Use    Vaping status: Every Day    Substances: Nicotine, Flavoring    Devices: Refillable tank   Substance and Sexual Activity    Alcohol use: No    Drug use: No    Sexual activity: Defer       Current Medication List  This medication list has been reviewed with the patient and evaluated for any interactions or necessary modifications/recommendations, and updated to include all prescription medications, OTC medications, and supplements the patient is currently taking.  This list reflects what is contained in the patient's profile, which has also been marked as reviewed to communicate to other providers it is the most up to date version of the patient's current medication therapy.   Patient has no known allergies.    Current Outpatient Medications:     busPIRone (BUSPAR) 10 MG tablet, Take 1 tablet by mouth 2 (Two) Times a Day., Disp: , Rfl:     busPIRone (BUSPAR) 15 MG tablet, Take 1 tablet by mouth Every 12 (Twelve) Hours., Disp: , Rfl:     cetirizine (zyrTEC) 10 MG tablet, Take 1 tablet by mouth Daily., Disp: , Rfl:     Cholecalciferol (Vitamin D) 50 MCG ( UT) capsule, Take 1 capsule by mouth Daily., Disp: , Rfl:     cyclobenzaprine (FLEXERIL) 10 MG tablet, Take 1 tablet by mouth At Night As Needed for Muscle Spasms., Disp: , Rfl:     Fluticasone-Umeclidin-Vilant (Trelegy Ellipta) 200-62.5-25 MCG/ACT inhaler, Inhale 1 puff Daily. Rinse mouth out after use, Disp: 1 each, Rfl: 5    folic acid (FOLVITE) 1 MG tablet, Take 1 tablet by mouth Daily., Disp: , Rfl:     [START ON 2024] lamoTRIgine (LaMICtal) 100 MG tablet, Take 1 tablet by mouth Daily., Disp: , Rfl:     lamoTRIgine (LaMICtal) 25 MG tablet, Take 3 tablets by mouth Daily., Disp: , Rfl:     levalbuterol (XOPENEX HFA) 45 MCG/ACT inhaler, Inhale 1 puff Every 4 (Four) Hours As Needed., Disp: , Rfl:     levothyroxine (SYNTHROID,  "LEVOTHROID) 75 MCG tablet, Take 1 tablet by mouth Daily., Disp: , Rfl:     Mepolizumab (Nucala) 100 MG/ML solution auto-injector, Inject 1 mL under the skin into the appropriate area as directed Every 28 (Twenty-Eight) Days., Disp: 1 mL, Rfl: 11    pantoprazole (PROTONIX) 40 MG EC tablet, Take 1 tablet by mouth Daily., Disp: , Rfl:     prazosin (MINIPRESS) 1 MG capsule, Take 1 capsule by mouth every night at bedtime., Disp: , Rfl:     Ventolin  (90 Base) MCG/ACT inhaler, INHALE 2 PUFFS EVERY 4 HOURS AS NEEDED FOR WEEZING, Disp: 18 g, Rfl: 3         Relevant Laboratory Values  Relevant laboratory values were discussed with the patient. The following specialty medication dose adjustment(s) are recommended: None  Lab Results   Component Value Date    CALCIUM 8.9 02/06/2022     02/06/2022    K 3.4 (L) 02/06/2022    CO2 25 02/06/2022     02/06/2022    BUN 7 02/06/2022    CREATININE 0.73 02/06/2022    EGFRIFAFRI >60 02/06/2022    EGFRIFNONA >60 02/06/2022    BCR 10 02/06/2022    ANIONGAP 10 02/06/2022     No results found for: \"CHOL\", \"CHLPL\", \"TRIG\", \"HDL\", \"LDL\", \"LDLDIRECT\"      Vaccination Status   COVID 19: vaccinated x2  Influenza: declines  Pneumococcal: declines  Zoster: N/A    Drug-Drug Interactions: no interactions noted with Nucala    Drug-Disease Interactions (non-cardioselective beta blockers, NSAIDs): N/A    Insurance Coverage & Financial Support  Patient Assistance: N/A    Inhaler Technique Observed? N    Treatment Goals: Risk Reduction and Symptom Control     Patient Education: The patient has been provided with the following education and any applicable administration techniques (i.e. self-injection) have been demonstrated for the therapies indicated.  Initial Education Provided for Nucala  The patient has been provided with the following education for Nucala. All questions and concerns have been addressed prior to the patient receiving the medication, and the patient has verbalized " understanding of the education and any materials provided. Additional patient education shall be provided and documented upon request by the patient, provider or payer.      The following counseling points for Nucala (mepolizumab) were addressed:  Goal of treatment:  This medication is used for the maintenance treatment of severe asthma in combination with other asthma medicines. The goal of treatment is to improve your breathing and prevent severe asthma attacks by reducing blood eosinophils (which are associated with inflammation).  Nucala is not a rescue treatment  Directions for use:  Nucala is injected under your skin (subcutaneously) once every 4 weeks. You will receive your first injection in clinic. The injection is given into one of the following areas: thigh, abdomen (at least 2 inches away from navel), or back of upper arm if given by someone else. If dose is 300 mg, give as 3 separate injections at least 2 inches apart. Remove medication from the refrigerator and allow to sit at room temperature for up to 30 minutes before use. Do not remove the cap or cover and do not heat. Avoid administration in skin that is tender, bruised, red, or hard.  If you miss a dose, inject a dose as soon as possible. Then continue (resume) your injection on your regular dosing schedule. If you do not notice that you have missed a dose until it is time for your next scheduled dose, then inject the next scheduled dose as planned. If you are not sure when to inject Nucala, call the clinic for instructions.  Step-by-step administration education provided in clinic while giving first injection  Adverse effects:  Possible side effects of this medication include: headache, injection site reaction, back pain, and fatigue  Even though it may be rare, some people may have severe side effects when taking a medication. Go to the ED or call 911 right away if you have any of the following: signs of an allergic reaction like rash; hives;  wheezing; chest tightness; trouble breathing, swallowing, or talking; swelling of the mouth, face, lips, tongue, or throat  Other considerations:  Helminth infections: it is unknown if Nucala will influence the immune response against parasitic infections. If you have a pre-existing helminth infection, this should be treated prior to starting Nucala. If you become infected while taking Nucala and do not respond to anti-helminth treatment, Nucala will need to be discontinued until the infection resolves.  Opportunistic herpes zoster infection: use of Nucala may result in an opportunistic infection of herpes zoster (shingles). Consider getting a shingles vaccine prior to initiation of Nucala.  Pregnancy registry (if applicable): it is not known if Nucala is harmful in pregnancy. Uncontrolled asthma is associated with adverse events on pregnancy and poorly controlled asthma may have greater risk than what is associated with asthma medications. It is encouraged that you enroll in the pregnancy registry for Nucala in order to collect information about the health of you and your baby.  Storage/Disposal  Store prefilled auto-injector in the refrigerator in the original carton until it is time to use. Do not freeze or heat. Do not shake.  If necessary, an unopened carton can be stored at room temperature for up to 7 days. If taken out of the carton, the auto-injector must be used within 8 hours.  You can dispose of the empty injector in a sharps container. If this is not available, you may use an empty hard plastic container such as a milk jug or tide container.    Identified barriers to adherence/administration: none    Medication Assessment & Plan:  Patient will continue Nucala (mepolizumab) 100 mg SQ every 4 weeks for severe asthma. Medication counseling provided to patient as documented above. Reviewed step-by-step demonstration of appropriate injection technique. All questions and concerns addressed. Patient reports  they are comfortable continuing administering injections at home.  Advised patient on the importance of continuing maintenance inhaler regimen and the importance of rinsing mouth after ICS use. This injection does not replace your maintenance inhaler and is not used to treat an asthma attack (use a rescue inhaler).   Recommended the following vaccinations: yearly influenza, PNA, COVID booster at minimum; pt declined  Patient will continue regular follow-up with pulmonology. PharmD to continue clinical re-assessments at least every 6 months to review tolerance, efficacy, and adherence to Nucala.      Attestation  Therapeutic appropriateness: Appropriate   I attest the patient was actively involved in and has agreed to the above plan of care. If the prescribed therapy is at any point deemed not appropriate based on the current or future assessments, a consultation will be initiated with the patient's specialty care provider to determine the best course of action. The revised plan of therapy will be documented along with any required assessments and/or additional patient education provided.     ROSALVA Monzon, PharmD  11/20/2024  10:54 EST

## 2024-11-20 NOTE — PROGRESS NOTES
Specialty Pharmacy Refill Coordination Note     Basilia is a 39 y.o. female contacted today regarding refills of  Nucala specialty medication(s).    Reviewed and verified with patient:  Allergies  Meds  Problems       Specialty medication(s) and dose(s) confirmed: yes        Delivery Questions      Flowsheet Row Most Recent Value   Delivery method UPS   Delivery address verified with patient/caregiver? Yes   Delivery address Other (Comment)  [Mail to pt's work address per her request.  [address] [place] [id number]]   Number of medications in delivery 1   Medication(s) being filled and delivered Mepolizumab (Nucala)   Doses left of specialty medications 11 refills   Copay verified? Yes                   Follow-up: 4 week(s)     Afshin Cormier MA  Specialty Pharmacy Technician

## 2024-12-17 ENCOUNTER — SPECIALTY PHARMACY (OUTPATIENT)
Dept: PHARMACY | Facility: HOSPITAL | Age: 39
End: 2024-12-17
Payer: COMMERCIAL

## 2024-12-17 NOTE — PROGRESS NOTES
Specialty Pharmacy Refill Coordination Note     Basilia is a 39 y.o. female contacted today regarding refills of  NUCALA specialty medication(s).    Reviewed and verified with patient:  Allergies  Meds       Specialty medication(s) and dose(s) confirmed: yes    Refill Questions      Flowsheet Row Most Recent Value   Changes to allergies? No   Changes to medications? No   New conditions or infections since last clinic visit No   Unplanned office visit, urgent care, ED, or hospital admission in the last 4 weeks  No   How does patient/caregiver feel medication is working? Excellent   Financial problems or insurance changes  No   Since the previous refill, were any specialty medication doses or scheduled injections missed or delayed?  No   Does this patient require a clinical escalation to a pharmacist? No            Delivery Questions      Flowsheet Row Most Recent Value   Delivery method UPS   Delivery address verified with patient/caregiver? Yes   Delivery address Temporary   Number of medications in delivery 1   Medication(s) being filled and delivered Mepolizumab (Nucala)   Doses left of specialty medications 11   Copay verified? Yes   Copay amount $0   Copay form of payment No copayment ($0)   Ship Date 12/18/2024   Delivery Date 12/19/2024   Signature Required Yes                   Follow-up: 4 week(s)     Lida Lira MA  Specialty Pharmacy Technician

## 2025-01-15 ENCOUNTER — SPECIALTY PHARMACY (OUTPATIENT)
Dept: PHARMACY | Facility: HOSPITAL | Age: 40
End: 2025-01-15
Payer: COMMERCIAL

## 2025-01-15 NOTE — PROGRESS NOTES
Specialty Pharmacy Refill Coordination Note     Basilia is a 39 y.o. female contacted today regarding refills of  NUCALA specialty medication(s).    Reviewed and verified with patient:  Allergies  Meds       Specialty medication(s) and dose(s) confirmed: yes    Refill Questions      Flowsheet Row Most Recent Value   Changes to allergies? Yes   Changes to medications? No   New conditions or infections since last clinic visit No   Unplanned office visit, urgent care, ED, or hospital admission in the last 4 weeks  No   How does patient/caregiver feel medication is working? Excellent   Financial problems or insurance changes  No   Since the previous refill, were any specialty medication doses or scheduled injections missed or delayed?  No   Does this patient require a clinical escalation to a pharmacist? No            Delivery Questions      Flowsheet Row Most Recent Value   Delivery method UPS   Delivery address verified with patient/caregiver? Yes   Delivery address Home   Number of medications in delivery 1   Medication(s) being filled and delivered Mepolizumab (Nucala)   Doses left of specialty medications 9   Copay verified? Yes   Copay amount $0   Copay form of payment No copayment ($0)   Ship Date 01/15/2025   Delivery Date 01/16/2025   Signature Required Yes                   Follow-up: 4 week(s)     Lida Lira MA  Specialty Pharmacy Technician

## 2025-02-12 ENCOUNTER — SPECIALTY PHARMACY (OUTPATIENT)
Dept: PHARMACY | Facility: HOSPITAL | Age: 40
End: 2025-02-12
Payer: COMMERCIAL

## 2025-03-10 ENCOUNTER — SPECIALTY PHARMACY (OUTPATIENT)
Dept: PHARMACY | Facility: HOSPITAL | Age: 40
End: 2025-03-10
Payer: COMMERCIAL

## 2025-03-10 RX ORDER — GALCANEZUMAB 120 MG/ML
120 INJECTION, SOLUTION SUBCUTANEOUS ONCE
COMMUNITY
Start: 2025-03-04

## 2025-03-10 RX ORDER — RIMEGEPANT SULFATE 75 MG/75MG
75 TABLET, ORALLY DISINTEGRATING ORAL ONCE
COMMUNITY
Start: 2025-03-01

## 2025-03-10 RX ORDER — IPRATROPIUM BROMIDE AND ALBUTEROL SULFATE 2.5; .5 MG/3ML; MG/3ML
1.5 SOLUTION RESPIRATORY (INHALATION) AS NEEDED
COMMUNITY
Start: 2025-02-18

## 2025-03-10 RX ORDER — BROMPHENIRAMINE MALEATE, PSEUDOEPHEDRINE HYDROCHLORIDE, AND DEXTROMETHORPHAN HYDROBROMIDE 2; 30; 10 MG/5ML; MG/5ML; MG/5ML
5 SYRUP ORAL 3 TIMES DAILY PRN
COMMUNITY
Start: 2025-03-05

## 2025-03-10 RX ORDER — LUBIPROSTONE 24 UG/1
1 CAPSULE ORAL EVERY 12 HOURS SCHEDULED
COMMUNITY
Start: 2025-03-01

## 2025-03-10 RX ORDER — HYDROXYZINE PAMOATE 25 MG/1
25 CAPSULE ORAL NIGHTLY
COMMUNITY
Start: 2025-03-06

## 2025-03-10 NOTE — PROGRESS NOTES
Specialty Pharmacy Patient Management Program  Refill Outreach     Basilia was contacted today regarding refills of their medication(s).    Refill Questions      Flowsheet Row Most Recent Value   Changes to allergies? No   Changes to medications? Yes   New conditions or infections since last clinic visit No   Unplanned office visit, urgent care, ED, or hospital admission in the last 4 weeks  No   How does patient/caregiver feel medication is working? Excellent   Financial problems or insurance changes  No   Since the previous refill, were any specialty medication doses or scheduled injections missed or delayed?  No   Does this patient require a clinical escalation to a pharmacist? No            Delivery Questions      Flowsheet Row Most Recent Value   Delivery method UPS   Delivery address verified with patient/caregiver? Yes   Delivery address Prescription   Number of medications in delivery 1   Medication(s) being filled and delivered Mepolizumab (Nucala)   Doses left of specialty medications 8   Copay verified? Yes   Copay amount $0   Copay form of payment No copayment ($0)   Delivery Date Selection 03/11/25   Signature Required Yes                 Follow-up: 4 week(s)     Lida Lira MA  3/10/2025  10:53 EDT

## 2025-03-20 NOTE — TELEPHONE ENCOUNTER
..My name is Violette Teran and I am the Practice Manager for [facility] [place].    I would like to thank you for being a loyal patient. If you do not mind I would like to ask you a few questions about your recent visit with us.  Please feel free to reply if you wish to provide us with feedback on your first visit with our practice.    First, could you tell me what went well with your recent visit?    Secondly, we are always looking for ways to make our patients' experiences even better.  Do you have any recommendations on ways we may improve?    Finally, overall were you satisfied with your first visit to us as a [facility] facility?    In the next few days, you will be receiving a Patient Experience Survey.      Thank you for taking the time to answer a few questions today.  I hope you have a good day.     Faxed appeal letter today to [employer]. Per denial letter, they have 30 days to make a decision. In meantime, pulm office working on getting a sample for this month's dose.

## 2025-04-07 ENCOUNTER — SPECIALTY PHARMACY (OUTPATIENT)
Dept: PHARMACY | Facility: HOSPITAL | Age: 40
End: 2025-04-07
Payer: COMMERCIAL

## 2025-04-07 NOTE — PROGRESS NOTES
Specialty Pharmacy Patient Management Program  Refill Outreach     Basilia was contacted today regarding refills of their medication(s).    Refill Questions      Flowsheet Row Most Recent Value   Changes to allergies? No   Changes to medications? No   New conditions or infections since last clinic visit No   Unplanned office visit, urgent care, ED, or hospital admission in the last 4 weeks  No   How does patient/caregiver feel medication is working? Excellent   Financial problems or insurance changes  No   Since the previous refill, were any specialty medication doses or scheduled injections missed or delayed?  No   Does this patient require a clinical escalation to a pharmacist? No            Delivery Questions      Flowsheet Row Most Recent Value   Delivery method UPS   Delivery address verified with patient/caregiver? Yes   Delivery address Prescription   Number of medications in delivery 1   Medication(s) being filled and delivered Mepolizumab (Nucala)   Doses left of specialty medications 6   Copay verified? Yes   Copay amount $0   Copay form of payment No copayment ($0)   Delivery Date Selection 04/08/25   Signature Required Yes   Do you consent to receive electronic handouts?  No                 Follow-up: 4 week(s)     Lida Lira MA  4/7/2025  10:06 EDT

## 2025-05-06 ENCOUNTER — SPECIALTY PHARMACY (OUTPATIENT)
Dept: PHARMACY | Facility: HOSPITAL | Age: 40
End: 2025-05-06
Payer: COMMERCIAL

## 2025-05-06 NOTE — PROGRESS NOTES
Specialty Pharmacy Patient Management Program  Refill Outreach     Basilia was contacted today regarding refills of their medication(s).    Refill Questions      Flowsheet Row Most Recent Value   Changes to allergies? No   Changes to medications? No   New conditions or infections since last clinic visit No   Unplanned office visit, urgent care, ED, or hospital admission in the last 4 weeks  No   How does patient/caregiver feel medication is working? Excellent   Financial problems or insurance changes  No   Since the previous refill, were any specialty medication doses or scheduled injections missed or delayed?  No   Does this patient require a clinical escalation to a pharmacist? No            Delivery Questions      Flowsheet Row Most Recent Value   Delivery method UPS   Delivery address verified with patient/caregiver? Yes   Delivery address Prescription   Number of medications in delivery 1   Medication(s) being filled and delivered Mepolizumab (Nucala)   Doses left of specialty medications 5   Copay verified? Yes   Copay amount $0   Copay form of payment No copayment ($0)   Delivery Date Selection 05/07/25   Signature Required Yes   Do you consent to receive electronic handouts?  No                 Follow-up: 4 week(s)     Lida Lira MA  5/6/2025  10:46 EDT

## 2025-05-14 ENCOUNTER — SPECIALTY PHARMACY (OUTPATIENT)
Dept: PHARMACY | Facility: HOSPITAL | Age: 40
End: 2025-05-14
Payer: COMMERCIAL

## 2025-05-14 RX ORDER — BUSPIRONE HYDROCHLORIDE 30 MG/1
15 TABLET ORAL 2 TIMES DAILY
COMMUNITY

## 2025-05-14 RX ORDER — LAMOTRIGINE 100 MG/1
50 TABLET ORAL DAILY
COMMUNITY

## 2025-05-14 RX ORDER — BUSPIRONE HYDROCHLORIDE 30 MG/1
30 TABLET ORAL DAILY
COMMUNITY

## 2025-05-14 NOTE — PROGRESS NOTES
[facility] Clinic  Specialty Pharmacy Patient Management Program  Asthma Reassessment     Basilia Sultana is a 39 y.o. female with asthma seen by a pulmonology provider and enrolled in the Pulmonology Patient Management Program offered by [facility].  A follow-up outreach was conducted, including assessment of continued therapy appropriateness, medication adherence, and side effect incidence and management for Nucala.    Changes to Insurance Coverage or Financial Support  [employer] [place]     Relevant Past Medical History and Comorbidities  Relevant medical history and concomitant health conditions were discussed with the patient. The patient's chart has been reviewed for relevant past medical history and comorbid health conditions and updated as necessary.   Past Medical History:   Diagnosis Date    Acid reflux     Allergic     Anxiety     Asthma     COPD (chronic obstructive pulmonary disease)      Social History     Socioeconomic History    Marital status:    Tobacco Use    Smoking status: Every Day     Current packs/day: 0.00     Average packs/day: 0.5 packs/day for 22.1 years (11.0 ttl pk-yrs)     Types: Electronic Cigarette, Cigarettes     Start date: 1/3/1995     Last attempt to quit: 2017     Years since quittin.2     Passive exposure: Current    Smokeless tobacco: Never   Vaping Use    Vaping status: Every Day    Substances: Nicotine, Flavoring    Devices: Refillable tank   Substance and Sexual Activity    Alcohol use: No    Drug use: No    Sexual activity: Defer     Problem list reviewed by Hilaria Shukla RP on 2025 at  1:17 PM    Hospitalizations and Urgent Care Since Last Assessment  ED Visits, Admissions, or Hospitalizations: None    Urgent Office Visits: N/A     Allergies  Known allergies and reactions were discussed with the patient. The patient's chart has been reviewed for allergy information and updated as necessary.   No Known  Allergies       Relevant Laboratory Values      Lab Assessment  No recent labs to review; patient to follow-up with MARY Mercedes on 5/22/25    Current Medication List  This medication list has been reviewed with the patient and evaluated for any interactions or necessary modifications/recommendations, and updated to include all prescription medications, OTC medications, and supplements the patient is currently taking.  This list reflects what is contained in the patient's profile, which has also been marked as reviewed to communicate to other providers it is the most up to date version of the patient's current medication therapy.     Current Outpatient Medications:     busPIRone (BUSPAR) 30 MG tablet, Take 1 tablet by mouth Daily., Disp: , Rfl:     busPIRone (BUSPAR) 30 MG tablet, Take 0.5 tablets by mouth 2 (Two) Times a Day., Disp: , Rfl:     Cholecalciferol (Vitamin D) 50 MCG (2000 UT) capsule, Take 1 capsule by mouth Daily., Disp: , Rfl:     cyclobenzaprine (FLEXERIL) 10 MG tablet, Take 1 tablet by mouth At Night As Needed for Muscle Spasms., Disp: , Rfl:     Emgality 120 MG/ML auto-injector pen, Inject 1 mL under the skin into the appropriate area as directed Every 30 (Thirty) Days., Disp: , Rfl:     Fluticasone-Umeclidin-Vilant (Trelegy Ellipta) 200-62.5-25 MCG/ACT inhaler, Inhale 1 puff Daily. Rinse mouth out after use, Disp: 1 each, Rfl: 5    folic acid (FOLVITE) 1 MG tablet, Take 1 tablet by mouth Daily., Disp: , Rfl:     hydrOXYzine pamoate (VISTARIL) 25 MG capsule, Take 1 capsule by mouth At Night As Needed., Disp: , Rfl:     ipratropium-albuterol (DUO-NEB) 0.5-2.5 mg/3 ml nebulizer, Take 1.5 mL by nebulization 4 (Four) Times a Day As Needed., Disp: , Rfl:     lamoTRIgine (LaMICtal) 100 MG tablet, Take 0.5 tablets by mouth Daily., Disp: , Rfl:     levalbuterol (XOPENEX HFA) 45 MCG/ACT inhaler, Inhale 1 puff Every 4 (Four) Hours As Needed., Disp: , Rfl:     levothyroxine (SYNTHROID, LEVOTHROID)  75 MCG tablet, Take 1 tablet by mouth Daily., Disp: , Rfl:     lubiprostone (AMITIZA) 24 MCG capsule, Take 1 capsule by mouth Every 12 (Twelve) Hours., Disp: , Rfl:     Mepolizumab (Nucala) 100 MG/ML solution auto-injector, Inject 1 mL under the skin into the appropriate area as directed Every 28 (Twenty-Eight) Days., Disp: 1 mL, Rfl: 11    Nurtec 75 MG dispersible tablet, Place 1 tablet under the tongue Daily As Needed., Disp: , Rfl:     pantoprazole (PROTONIX) 40 MG EC tablet, Take 1 tablet by mouth Daily., Disp: , Rfl:     prazosin (MINIPRESS) 1 MG capsule, Take 1 capsule by mouth every night at bedtime., Disp: , Rfl:     Ventolin  (90 Base) MCG/ACT inhaler, INHALE 2 PUFFS EVERY 4 HOURS AS NEEDED FOR WEEZING, Disp: 18 g, Rfl: 3    Medicines reviewed by Hilaria Shukla [facility] on 5/14/2025 at  1:17 PM    Drug Interactions  N/A     Adverse Drug Reactions  Medication tolerability: Tolerating with no to minimal ADRs          Medication plan: Continue therapy with normal follow-up  Plan for ADR Management: N/A      Adherence, Self-Administration, and Current Therapy Problems  Adherence related patient's specialty therapy was discussed with the patient. The Adherence segment of this outreach has been reviewed and updated.   Adherence Questions  Linked Medication(s) Assessed: Mepolizumab (Nucala)  On average, how many doses/injections does the patient miss per month?: 0  What are the identified reasons for non-adherence or missed doses? : no problems identified  What is the estimated medication adherence level?: %  Based on the patient/caregiver response and refill history, does this patient require an MTP to track adherence improvements?: no    Additional Barriers to Patient Self-Administration: N/A  Methods for Supporting Patient Self-Administration: N/A    Recently Close Medication Therapy Problems  No medication therapy recommendations to display  Open Medication Therapy Problems  No medication therapy  recommendations to display     Goals of Therapy  Goals related to the patient's specialty therapy was discussed with the patient. The Patient Goals segment of this outreach has been reviewed and updated.    Goals Addressed Today        Specialty Pharmacy General Goal      Reduction in frequency of severe asthma symptoms to < 3 times per month  8/22/2024: patient to continue with inhaler only once daily and compliance with Nucala.  10/24/24: patient continues to be adherent with Nucala, no issues and continued good response to therapy. Needing refill today.  11/20/24: new enrollment as Rx being transferred to [facility]. Continues good response to therapy, on track.  5/14/25: Patient stated that she believes that Nucala has helped her greatly since starting it. She reports that the frequency of her severe asthma symptoms is <3x/month.               Quality of Life Assessment   Quality of Life related to the patient's enrollment in the patient management program and services provided was discussed with the patient. The QOL segment of this outreach has been reviewed and updated.   Quality of Life Improvement Scale: 10-Significantly better    Reassessment Plan & Follow-Up  Medication Therapy Changes: No therapy changes at this time.   Related Plans, Therapy Recommendations, or Issues to Be Addressed: None   Pharmacist to perform regular reassessments no more than (6) months from the previous assessment.  Care Coordinator to set up future refill outreaches, coordinate prescription delivery, and escalate clinical questions to pharmacist.     Attestation  Therapeutic appropriateness: Appropriate  I attest the patient was actively involved in and has agreed to the above plan of care. If the prescribed therapy is at any point deemed not appropriate based on the current or future assessments, a consultation will be initiated with the patient's specialty care provider to determine the best course of action. The revised plan of  therapy will be documented along with any additional patient education provided. Discussed aforementioned material with patient by phone.    Lucy FrancoD  [facility]   5/14/2025   14:14 EDT

## 2025-05-22 ENCOUNTER — OFFICE VISIT (OUTPATIENT)
Dept: PULMONOLOGY | Facility: CLINIC | Age: 40
End: 2025-05-22
Payer: COMMERCIAL

## 2025-05-22 VITALS
SYSTOLIC BLOOD PRESSURE: 138 MMHG | BODY MASS INDEX: 36.14 KG/M2 | HEART RATE: 79 BPM | HEIGHT: 63 IN | WEIGHT: 204 LBS | OXYGEN SATURATION: 97 % | DIASTOLIC BLOOD PRESSURE: 80 MMHG

## 2025-05-22 DIAGNOSIS — Z86.69 HISTORY OF SLEEP APNEA: ICD-10-CM

## 2025-05-22 DIAGNOSIS — J45.41 MODERATE PERSISTENT ASTHMA WITH EXACERBATION: ICD-10-CM

## 2025-05-22 DIAGNOSIS — Z98.890 HISTORY OF REVERSAL OF ILEOSTOMY: ICD-10-CM

## 2025-05-22 DIAGNOSIS — J45.40 MODERATE PERSISTENT ASTHMA WITHOUT COMPLICATION: Primary | ICD-10-CM

## 2025-05-22 DIAGNOSIS — F17.210 NICOTINE DEPENDENCE, CIGARETTES, UNCOMPLICATED: ICD-10-CM

## 2025-05-22 RX ORDER — PROMETHAZINE HYDROCHLORIDE 25 MG/1
TABLET ORAL
COMMUNITY

## 2025-05-22 RX ORDER — PREDNISONE 20 MG/1
40 TABLET ORAL DAILY
Qty: 10 TABLET | Refills: 0 | Status: SHIPPED | OUTPATIENT
Start: 2025-05-22

## 2025-05-22 RX ORDER — METHYLPREDNISOLONE SODIUM SUCCINATE 125 MG/2ML
125 INJECTION INTRAMUSCULAR; INTRAVENOUS ONCE
Status: COMPLETED | OUTPATIENT
Start: 2025-05-22 | End: 2025-05-22

## 2025-05-22 RX ADMIN — METHYLPREDNISOLONE SODIUM SUCCINATE 125 MG: 125 INJECTION INTRAMUSCULAR; INTRAVENOUS at 10:35
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
## 2025-06-12 ENCOUNTER — SPECIALTY PHARMACY (OUTPATIENT)
Dept: PHARMACY | Facility: HOSPITAL | Age: 40
End: 2025-06-12
Payer: COMMERCIAL

## 2025-06-12 NOTE — PROGRESS NOTES
Specialty Pharmacy Patient Management Program  Refill Outreach     Basilia was contacted today regarding refills of their medication(s).    Refill Questions      Flowsheet Row Most Recent Value   Changes to allergies? No   Changes to medications? No   New conditions or infections since last clinic visit No   Unplanned office visit, urgent care, ED, or hospital admission in the last 4 weeks  No   How does patient/caregiver feel medication is working? Good   Financial problems or insurance changes  No            Delivery Questions      Flowsheet Row Most Recent Value   Delivery method [employer]   Delivery address verified with patient/caregiver? Yes   Delivery address Prescription  [Mailing to work, listed under prescription address]   Number of medications in delivery 1   Medication(s) being filled and delivered Mepolizumab (Nucala)   Doses left of specialty medications 4 doses/refills   Copay verified? Yes   Copay amount $0   Copay form of payment No copayment ($0)   Signature Required Yes   Do you consent to receive electronic handouts?  No            New prior authorization submitted via [employer] (Key: [id number]) and approved from 06-12-25 to 06-     Follow-up: 4 week(s)     Afshin Cormier MA  6/12/2025  10:20 EDT

## 2025-07-09 ENCOUNTER — SPECIALTY PHARMACY (OUTPATIENT)
Dept: PHARMACY | Facility: HOSPITAL | Age: 40
End: 2025-07-09
Payer: COMMERCIAL

## 2025-07-09 NOTE — PROGRESS NOTES
Specialty Pharmacy Patient Management Program  Refill Outreach     Basilia was contacted today regarding refills of their medication(s).    Refill Questions      Flowsheet Row Most Recent Value   Changes to allergies? No   Changes to medications? No   New conditions or infections since last clinic visit No   Unplanned office visit, urgent care, ED, or hospital admission in the last 4 weeks  No   How does patient/caregiver feel medication is working? Excellent   Financial problems or insurance changes  No   Since the previous refill, were any specialty medication doses or scheduled injections missed or delayed?  No   Does this patient require a clinical escalation to a pharmacist? No            Delivery Questions      Flowsheet Row Most Recent Value   Delivery method [employer]   Delivery address verified with patient/caregiver? Yes   Delivery address Home   Number of medications in delivery 1   Medication(s) being filled and delivered Mepolizumab (Nucala)   Doses left of specialty medications 3   Copay verified? Yes   Copay amount $0   Copay form of payment No copayment ($0)   Delivery Date Selection 07/10/25   Signature Required Yes   Do you consent to receive electronic handouts?  No                 Follow-up: 4 week(s)     Lida Lira MA  7/9/2025  11:00 EDT

## 2025-08-06 ENCOUNTER — SPECIALTY PHARMACY (OUTPATIENT)
Dept: PHARMACY | Facility: HOSPITAL | Age: 40
End: 2025-08-06
Payer: COMMERCIAL